# Patient Record
Sex: FEMALE | Race: WHITE | NOT HISPANIC OR LATINO | Employment: OTHER | URBAN - METROPOLITAN AREA
[De-identification: names, ages, dates, MRNs, and addresses within clinical notes are randomized per-mention and may not be internally consistent; named-entity substitution may affect disease eponyms.]

---

## 2017-08-29 ENCOUNTER — HISTORICAL (OUTPATIENT)
Dept: LAB | Facility: HOSPITAL | Age: 62
End: 2017-08-29

## 2017-08-29 LAB
ALBUMIN SERPL-MCNC: 3.8 GM/DL (ref 3.4–5)
ALBUMIN/GLOB SERPL: 1.2 RATIO (ref 1.1–2)
ALP SERPL-CCNC: 99 UNIT/L (ref 46–116)
ALT SERPL-CCNC: 16 UNIT/L (ref 12–78)
AST SERPL-CCNC: 14 UNIT/L (ref 15–37)
BILIRUB SERPL-MCNC: 0.4 MG/DL (ref 0.2–1)
BILIRUBIN DIRECT+TOT PNL SERPL-MCNC: 0.1 MG/DL (ref 0–0.2)
BILIRUBIN DIRECT+TOT PNL SERPL-MCNC: 0.3 MG/DL (ref 0–0.8)
BUN SERPL-MCNC: 15.6 MG/DL (ref 7–18)
CALCIUM SERPL-MCNC: 8.8 MG/DL (ref 8.5–10.1)
CHLORIDE SERPL-SCNC: 106 MMOL/L (ref 98–107)
CHOLEST SERPL-MCNC: 235 MG/DL (ref 0–200)
CHOLEST/HDLC SERPL: 4.4 {RATIO} (ref 0–4)
CO2 SERPL-SCNC: 29.2 MMOL/L (ref 21–32)
CREAT SERPL-MCNC: 0.96 MG/DL (ref 0.6–1.3)
GLOBULIN SER-MCNC: 3.3 GM/DL (ref 2.4–3.5)
GLUCOSE SERPL-MCNC: 87 MG/DL (ref 74–106)
HDLC SERPL-MCNC: 53 MG/DL (ref 40–60)
LDLC SERPL CALC-MCNC: 161 MG/DL (ref 0–129)
POTASSIUM SERPL-SCNC: 4.1 MMOL/L (ref 3.5–5.1)
PROT SERPL-MCNC: 7.1 GM/DL (ref 6.4–8.2)
SODIUM SERPL-SCNC: 143 MMOL/L (ref 136–145)
TRIGL SERPL-MCNC: 104 MG/DL
TSH SERPL-ACNC: 2.34 MIU/ML (ref 0.36–3.74)
VLDLC SERPL CALC-MCNC: 21 MG/DL

## 2018-03-22 ENCOUNTER — HISTORICAL (OUTPATIENT)
Dept: RADIOLOGY | Facility: HOSPITAL | Age: 63
End: 2018-03-22

## 2018-03-22 LAB
ALBUMIN SERPL-MCNC: 3.7 GM/DL (ref 3.4–5)
ALBUMIN/GLOB SERPL: 1.1 RATIO (ref 1.1–2)
ALP SERPL-CCNC: 98 UNIT/L (ref 46–116)
ALT SERPL-CCNC: 19 UNIT/L (ref 12–78)
AST SERPL-CCNC: 6 UNIT/L (ref 15–37)
BILIRUB SERPL-MCNC: 0.4 MG/DL (ref 0.2–1)
BILIRUBIN DIRECT+TOT PNL SERPL-MCNC: 0.11 MG/DL (ref 0–0.2)
BILIRUBIN DIRECT+TOT PNL SERPL-MCNC: 0.29 MG/DL (ref 0–0.8)
BUN SERPL-MCNC: 12.9 MG/DL (ref 7–18)
CALCIUM SERPL-MCNC: 9.4 MG/DL (ref 8.5–10.1)
CHLORIDE SERPL-SCNC: 105 MMOL/L (ref 98–107)
CHOLEST SERPL-MCNC: 209 MG/DL (ref 0–200)
CHOLEST/HDLC SERPL: 4.1 {RATIO} (ref 0–4)
CO2 SERPL-SCNC: 28.9 MMOL/L (ref 21–32)
CREAT SERPL-MCNC: 0.85 MG/DL (ref 0.6–1.3)
GLOBULIN SER-MCNC: 3.3 GM/DL (ref 2.4–3.5)
GLUCOSE SERPL-MCNC: 97 MG/DL (ref 74–106)
HDLC SERPL-MCNC: 51 MG/DL (ref 40–60)
LDLC SERPL CALC-MCNC: 132 MG/DL (ref 0–129)
POTASSIUM SERPL-SCNC: 4.9 MMOL/L (ref 3.5–5.1)
PROT SERPL-MCNC: 7 GM/DL (ref 6.4–8.2)
SODIUM SERPL-SCNC: 141 MMOL/L (ref 136–145)
TRIGL SERPL-MCNC: 132 MG/DL
VLDLC SERPL CALC-MCNC: 26 MG/DL

## 2019-02-14 ENCOUNTER — HISTORICAL (OUTPATIENT)
Dept: LAB | Facility: HOSPITAL | Age: 64
End: 2019-02-14

## 2019-02-14 LAB
ABS NEUT (OLG): 3.15 X10(3)/MCL (ref 2.1–9.2)
ALBUMIN SERPL-MCNC: 3.7 GM/DL (ref 3.4–5)
ALBUMIN/GLOB SERPL: 1.2 RATIO (ref 1.1–2)
ALP SERPL-CCNC: 99 UNIT/L (ref 46–116)
ALT SERPL-CCNC: 12 UNIT/L (ref 12–78)
AST SERPL-CCNC: 11 UNIT/L (ref 15–37)
BASOPHILS # BLD AUTO: 0 X10(3)/MCL (ref 0–0.2)
BASOPHILS NFR BLD AUTO: 0 %
BILIRUB SERPL-MCNC: 0.4 MG/DL (ref 0.2–1)
BILIRUBIN DIRECT+TOT PNL SERPL-MCNC: 0.11 MG/DL (ref 0–0.2)
BILIRUBIN DIRECT+TOT PNL SERPL-MCNC: 0.29 MG/DL (ref 0–0.8)
BUN SERPL-MCNC: 11.7 MG/DL (ref 7–18)
CALCIUM SERPL-MCNC: 9 MG/DL (ref 8.5–10.1)
CHLORIDE SERPL-SCNC: 107 MMOL/L (ref 98–107)
CHOLEST SERPL-MCNC: 216 MG/DL (ref 0–200)
CHOLEST/HDLC SERPL: 4.3 {RATIO} (ref 0–4)
CO2 SERPL-SCNC: 29.4 MMOL/L (ref 21–32)
CREAT SERPL-MCNC: 0.94 MG/DL (ref 0.6–1.3)
DEPRECATED CALCIDIOL+CALCIFEROL SERPL-MC: 22.23 NG/ML (ref 30–80)
EOSINOPHIL # BLD AUTO: 0.3 X10(3)/MCL (ref 0–0.9)
EOSINOPHIL NFR BLD AUTO: 5 %
ERYTHROCYTE [DISTWIDTH] IN BLOOD BY AUTOMATED COUNT: 13.1 % (ref 11.5–17)
GLOBULIN SER-MCNC: 3 GM/DL (ref 2.4–3.5)
GLUCOSE SERPL-MCNC: 95 MG/DL (ref 74–106)
HCT VFR BLD AUTO: 43.1 % (ref 37–47)
HDLC SERPL-MCNC: 50 MG/DL (ref 40–60)
HGB BLD-MCNC: 14.1 GM/DL (ref 12–16)
IMM GRANULOCYTES # BLD AUTO: 0.01 % (ref 0–0.02)
IMM GRANULOCYTES NFR BLD AUTO: 0.2 % (ref 0–0.43)
LDLC SERPL CALC-MCNC: 138 MG/DL (ref 0–129)
LYMPHOCYTES # BLD AUTO: 2.3 X10(3)/MCL (ref 0.6–4.6)
LYMPHOCYTES NFR BLD AUTO: 36 %
MCH RBC QN AUTO: 32 PG (ref 27–31)
MCHC RBC AUTO-ENTMCNC: 32.7 GM/DL (ref 33–36)
MCV RBC AUTO: 97.7 FL (ref 80–94)
MONOCYTES # BLD AUTO: 0.6 X10(3)/MCL (ref 0.1–1.3)
MONOCYTES NFR BLD AUTO: 9 %
NEUTROPHILS # BLD AUTO: 3.15 X10(3)/MCL (ref 1.4–7.9)
NEUTROPHILS NFR BLD AUTO: 50 %
PLATELET # BLD AUTO: 319 X10(3)/MCL (ref 130–400)
PMV BLD AUTO: 8.9 FL (ref 9.4–12.4)
POTASSIUM SERPL-SCNC: 5.1 MMOL/L (ref 3.5–5.1)
PROT SERPL-MCNC: 6.7 GM/DL (ref 6.4–8.2)
RBC # BLD AUTO: 4.41 X10(6)/MCL (ref 4.2–5.4)
SODIUM SERPL-SCNC: 144 MMOL/L (ref 136–145)
T4 FREE SERPL-MCNC: 0.83 NG/DL (ref 0.76–1.46)
TRIGL SERPL-MCNC: 140 MG/DL
TSH SERPL-ACNC: 1.4 MIU/ML (ref 0.36–3.74)
VLDLC SERPL CALC-MCNC: 28 MG/DL
WBC # SPEC AUTO: 6.4 X10(3)/MCL (ref 4.5–11.5)

## 2019-07-05 ENCOUNTER — HISTORICAL (OUTPATIENT)
Dept: RADIOLOGY | Facility: HOSPITAL | Age: 64
End: 2019-07-05

## 2020-02-17 ENCOUNTER — HISTORICAL (OUTPATIENT)
Dept: RADIOLOGY | Facility: HOSPITAL | Age: 65
End: 2020-02-17

## 2020-02-20 ENCOUNTER — HISTORICAL (OUTPATIENT)
Dept: LAB | Facility: HOSPITAL | Age: 65
End: 2020-02-20

## 2020-02-20 LAB
BUN SERPL-MCNC: 16.5 MG/DL (ref 7–18)
CALCIUM SERPL-MCNC: 9.8 MG/DL (ref 8.5–10.1)
CHLORIDE SERPL-SCNC: 105 MMOL/L (ref 98–107)
CO2 SERPL-SCNC: 27.6 MMOL/L (ref 21–32)
CREAT SERPL-MCNC: 0.84 MG/DL (ref 0.6–1.3)
CREAT/UREA NIT SERPL: 20
ERYTHROCYTE [DISTWIDTH] IN BLOOD BY AUTOMATED COUNT: 13.1 % (ref 11.5–17)
GLUCOSE SERPL-MCNC: 91 MG/DL (ref 74–106)
HCT VFR BLD AUTO: 43.2 % (ref 37–47)
HGB BLD-MCNC: 14.2 GM/DL (ref 12–16)
MCH RBC QN AUTO: 32.1 PG (ref 27–31)
MCHC RBC AUTO-ENTMCNC: 32.9 GM/DL (ref 33–36)
MCV RBC AUTO: 97.7 FL (ref 80–94)
PLATELET # BLD AUTO: 344 X10(3)/MCL (ref 130–400)
PMV BLD AUTO: 8.9 FL (ref 9.4–12.4)
POTASSIUM SERPL-SCNC: 5.2 MMOL/L (ref 3.5–5.1)
RBC # BLD AUTO: 4.42 X10(6)/MCL (ref 4.2–5.4)
SODIUM SERPL-SCNC: 142 MMOL/L (ref 136–145)
WBC # SPEC AUTO: 7.1 X10(3)/MCL (ref 4.5–11.5)

## 2020-02-27 ENCOUNTER — HISTORICAL (OUTPATIENT)
Dept: CARDIOLOGY | Facility: HOSPITAL | Age: 65
End: 2020-02-27

## 2020-02-27 LAB
BUN SERPL-MCNC: 13 MG/DL (ref 7–18)
CALCIUM SERPL-MCNC: 8.8 MG/DL (ref 8.5–10.1)
CHLORIDE SERPL-SCNC: 108 MMOL/L (ref 98–107)
CO2 SERPL-SCNC: 31 MMOL/L (ref 21–32)
CREAT SERPL-MCNC: 0.95 MG/DL (ref 0.55–1.02)
CREAT/UREA NIT SERPL: 13.7
GLUCOSE SERPL-MCNC: 86 MG/DL (ref 74–106)
INR PPP: 0.9 (ref 0–1.3)
POTASSIUM SERPL-SCNC: 4.1 MMOL/L (ref 3.5–5.1)
PROTHROMBIN TIME: 12.3 SECOND(S) (ref 11.1–13.7)
SODIUM SERPL-SCNC: 141 MMOL/L (ref 136–145)

## 2020-08-05 ENCOUNTER — HISTORICAL (OUTPATIENT)
Dept: LAB | Facility: HOSPITAL | Age: 65
End: 2020-08-05

## 2020-08-05 LAB
ALBUMIN SERPL-MCNC: 3.7 GM/DL (ref 3.4–5)
ALP SERPL-CCNC: 66 UNIT/L (ref 46–116)
ALT SERPL-CCNC: 17 UNIT/L (ref 12–78)
AST SERPL-CCNC: 18 UNIT/L (ref 15–37)
BILIRUB SERPL-MCNC: 0.5 MG/DL (ref 0.2–1)
BILIRUBIN DIRECT+TOT PNL SERPL-MCNC: 0.16 MG/DL (ref 0–0.2)
BILIRUBIN DIRECT+TOT PNL SERPL-MCNC: 0.34 MG/DL (ref 0–0.8)
CHOLEST SERPL-MCNC: 150 MG/DL (ref 0–200)
CHOLEST/HDLC SERPL: 2.8 {RATIO} (ref 0–4)
HDLC SERPL-MCNC: 54 MG/DL (ref 40–60)
LDLC SERPL CALC-MCNC: 79 MG/DL (ref 0–129)
PROT SERPL-MCNC: 7.4 GM/DL (ref 6.4–8.2)
TRIGL SERPL-MCNC: 84 MG/DL
VLDLC SERPL CALC-MCNC: 17 MG/DL

## 2021-03-03 ENCOUNTER — HISTORICAL (OUTPATIENT)
Dept: RADIOLOGY | Facility: HOSPITAL | Age: 66
End: 2021-03-03

## 2022-03-24 ENCOUNTER — HISTORICAL (OUTPATIENT)
Dept: ADMINISTRATIVE | Facility: HOSPITAL | Age: 67
End: 2022-03-24

## 2022-03-24 ENCOUNTER — HISTORICAL (OUTPATIENT)
Dept: RADIOLOGY | Facility: HOSPITAL | Age: 67
End: 2022-03-24

## 2023-03-22 DIAGNOSIS — Z12.31 BREAST CANCER SCREENING BY MAMMOGRAM: Primary | ICD-10-CM

## 2023-03-28 ENCOUNTER — HOSPITAL ENCOUNTER (OUTPATIENT)
Dept: RADIOLOGY | Facility: HOSPITAL | Age: 68
Discharge: HOME | End: 2023-03-28
Attending: NURSE PRACTITIONER
Payer: MEDICARE

## 2023-03-28 DIAGNOSIS — Z12.31 BREAST CANCER SCREENING BY MAMMOGRAM: ICD-10-CM

## 2023-03-28 PROCEDURE — 77063 BREAST TOMOSYNTHESIS BI: CPT | Mod: 26,,, | Performed by: RADIOLOGY

## 2023-03-28 PROCEDURE — 77067 SCR MAMMO BI INCL CAD: CPT | Mod: 26,,, | Performed by: RADIOLOGY

## 2023-03-28 PROCEDURE — 77063 MAMMO DIGITAL SCREENING BILAT WITH TOMO: ICD-10-PCS | Mod: 26,,, | Performed by: RADIOLOGY

## 2023-03-28 PROCEDURE — 77067 SCR MAMMO BI INCL CAD: CPT | Mod: TC

## 2023-03-28 PROCEDURE — 77067 MAMMO DIGITAL SCREENING BILAT WITH TOMO: ICD-10-PCS | Mod: 26,,, | Performed by: RADIOLOGY

## 2023-06-20 DIAGNOSIS — R13.10 DYSPHAGIA: Primary | ICD-10-CM

## 2023-06-29 ENCOUNTER — HOSPITAL ENCOUNTER (OUTPATIENT)
Dept: RADIOLOGY | Facility: HOSPITAL | Age: 68
Discharge: HOME | End: 2023-06-29
Attending: UROLOGY
Payer: MEDICARE

## 2023-06-29 DIAGNOSIS — R13.10 DYSPHAGIA: ICD-10-CM

## 2023-06-29 PROCEDURE — 25500020 PHARM REV CODE 255: Performed by: UROLOGY

## 2023-06-29 PROCEDURE — A9698 NON-RAD CONTRAST MATERIALNOC: HCPCS | Performed by: UROLOGY

## 2023-06-29 PROCEDURE — 74220 X-RAY XM ESOPHAGUS 1CNTRST: CPT | Mod: TC

## 2023-06-29 RX ADMIN — BARIUM SULFATE 140 ML: 980 POWDER, FOR SUSPENSION ORAL at 08:06

## 2023-06-29 RX ADMIN — Medication 176 G: at 08:06

## 2024-01-23 ENCOUNTER — HOSPITAL ENCOUNTER (INPATIENT)
Facility: HOSPITAL | Age: 69
LOS: 6 days | Discharge: HOME HEALTH | DRG: 522 | End: 2024-01-29
Attending: STUDENT IN AN ORGANIZED HEALTH CARE EDUCATION/TRAINING PROGRAM | Admitting: SURGERY
Payer: MEDICARE

## 2024-01-23 DIAGNOSIS — S72.002A CLOSED FRACTURE OF NECK OF LEFT FEMUR, INITIAL ENCOUNTER: Primary | ICD-10-CM

## 2024-01-23 DIAGNOSIS — Z01.810 PREOP CARDIOVASCULAR EXAM: ICD-10-CM

## 2024-01-23 DIAGNOSIS — S92.062A CLOSED DISPLACED INTRA-ARTICULAR FRACTURE OF LEFT CALCANEUS, INITIAL ENCOUNTER: ICD-10-CM
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
## 2024-01-23 DIAGNOSIS — S92.002A CLOSED DISPLACED FRACTURE OF LEFT CALCANEUS, UNSPECIFIED PORTION OF CALCANEUS, INITIAL ENCOUNTER: ICD-10-CM

## 2024-01-23 DIAGNOSIS — S92.012A CLOSED DISPLACED FRACTURE OF BODY OF LEFT CALCANEUS, INITIAL ENCOUNTER: ICD-10-CM

## 2024-01-23 LAB
ABORH RETYPE: NORMAL
ALBUMIN SERPL-MCNC: 3.7 G/DL (ref 3.4–4.8)
ALBUMIN/GLOB SERPL: 1.3 RATIO (ref 1.1–2)
ALP SERPL-CCNC: 96 UNIT/L (ref 40–150)
ALT SERPL-CCNC: 9 UNIT/L (ref 0–55)
AST SERPL-CCNC: 16 UNIT/L (ref 5–34)
BASOPHILS # BLD AUTO: 0.05 X10(3)/MCL
BASOPHILS NFR BLD AUTO: 0.3 %
BILIRUB SERPL-MCNC: 0.3 MG/DL
BUN SERPL-MCNC: 14.6 MG/DL (ref 9.8–20.1)
CALCIUM SERPL-MCNC: 9.5 MG/DL (ref 8.4–10.2)
CHLORIDE SERPL-SCNC: 103 MMOL/L (ref 98–107)
CO2 SERPL-SCNC: 25 MMOL/L (ref 23–31)
CREAT SERPL-MCNC: 0.9 MG/DL (ref 0.55–1.02)
EOSINOPHIL # BLD AUTO: 0.02 X10(3)/MCL (ref 0–0.9)
EOSINOPHIL NFR BLD AUTO: 0.1 %
ERYTHROCYTE [DISTWIDTH] IN BLOOD BY AUTOMATED COUNT: 12.6 % (ref 11.5–17)
GFR SERPLBLD CREATININE-BSD FMLA CKD-EPI: >60 MLS/MIN/1.73/M2
GLOBULIN SER-MCNC: 2.8 GM/DL (ref 2.4–3.5)
GLUCOSE SERPL-MCNC: 127 MG/DL (ref 82–115)
GROUP & RH: NORMAL
HCT VFR BLD AUTO: 39.5 % (ref 37–47)
HGB BLD-MCNC: 13.2 G/DL (ref 12–16)
IMM GRANULOCYTES # BLD AUTO: 0.12 X10(3)/MCL (ref 0–0.04)
IMM GRANULOCYTES NFR BLD AUTO: 0.8 %
INDIRECT COOMBS: NORMAL
LYMPHOCYTES # BLD AUTO: 0.76 X10(3)/MCL (ref 0.6–4.6)
LYMPHOCYTES NFR BLD AUTO: 4.8 %
MCH RBC QN AUTO: 32.4 PG (ref 27–31)
MCHC RBC AUTO-ENTMCNC: 33.4 G/DL (ref 33–36)
MCV RBC AUTO: 97.1 FL (ref 80–94)
MONOCYTES # BLD AUTO: 0.7 X10(3)/MCL (ref 0.1–1.3)
MONOCYTES NFR BLD AUTO: 4.5 %
NEUTROPHILS # BLD AUTO: 14.08 X10(3)/MCL (ref 2.1–9.2)
NEUTROPHILS NFR BLD AUTO: 89.5 %
NRBC BLD AUTO-RTO: 0 %
PLATELET # BLD AUTO: 285 X10(3)/MCL (ref 130–400)
PMV BLD AUTO: 9.6 FL (ref 7.4–10.4)
POTASSIUM SERPL-SCNC: 4 MMOL/L (ref 3.5–5.1)
PROT SERPL-MCNC: 6.5 GM/DL (ref 5.8–7.6)
RBC # BLD AUTO: 4.07 X10(6)/MCL (ref 4.2–5.4)
SODIUM SERPL-SCNC: 137 MMOL/L (ref 136–145)
SPECIMEN OUTDATE: NORMAL
WBC # SPEC AUTO: 15.73 X10(3)/MCL (ref 4.5–11.5)

## 2024-01-23 PROCEDURE — 25500020 PHARM REV CODE 255: Performed by: STUDENT IN AN ORGANIZED HEALTH CARE EDUCATION/TRAINING PROGRAM

## 2024-01-23 PROCEDURE — 96374 THER/PROPH/DIAG INJ IV PUSH: CPT

## 2024-01-23 PROCEDURE — 86850 RBC ANTIBODY SCREEN: CPT | Performed by: STUDENT IN AN ORGANIZED HEALTH CARE EDUCATION/TRAINING PROGRAM

## 2024-01-23 PROCEDURE — 99285 EMERGENCY DEPT VISIT HI MDM: CPT | Mod: 25

## 2024-01-23 PROCEDURE — 11000001 HC ACUTE MED/SURG PRIVATE ROOM

## 2024-01-23 PROCEDURE — 99223 1ST HOSP IP/OBS HIGH 75: CPT | Mod: 57,,, | Performed by: ORTHOPAEDIC SURGERY

## 2024-01-23 PROCEDURE — 96376 TX/PRO/DX INJ SAME DRUG ADON: CPT

## 2024-01-23 PROCEDURE — 25000003 PHARM REV CODE 250: Performed by: NURSE PRACTITIONER

## 2024-01-23 PROCEDURE — 25000003 PHARM REV CODE 250

## 2024-01-23 PROCEDURE — 25000003 PHARM REV CODE 250: Performed by: STUDENT IN AN ORGANIZED HEALTH CARE EDUCATION/TRAINING PROGRAM

## 2024-01-23 PROCEDURE — 63600175 PHARM REV CODE 636 W HCPCS: Performed by: STUDENT IN AN ORGANIZED HEALTH CARE EDUCATION/TRAINING PROGRAM

## 2024-01-23 PROCEDURE — 96375 TX/PRO/DX INJ NEW DRUG ADDON: CPT

## 2024-01-23 PROCEDURE — 63600175 PHARM REV CODE 636 W HCPCS: Performed by: NURSE PRACTITIONER

## 2024-01-23 PROCEDURE — 80053 COMPREHEN METABOLIC PANEL: CPT | Performed by: STUDENT IN AN ORGANIZED HEALTH CARE EDUCATION/TRAINING PROGRAM

## 2024-01-23 PROCEDURE — 85025 COMPLETE CBC W/AUTO DIFF WBC: CPT | Performed by: STUDENT IN AN ORGANIZED HEALTH CARE EDUCATION/TRAINING PROGRAM

## 2024-01-23 RX ORDER — ACETAMINOPHEN 325 MG/1
650 TABLET ORAL EVERY 8 HOURS PRN
Status: DISCONTINUED | OUTPATIENT
Start: 2024-01-23 | End: 2024-01-25

## 2024-01-23 RX ORDER — ACETAMINOPHEN 325 MG/1
650 TABLET ORAL EVERY 4 HOURS
Status: DISCONTINUED | OUTPATIENT
Start: 2024-01-23 | End: 2024-01-25

## 2024-01-23 RX ORDER — TALC
6 POWDER (GRAM) TOPICAL NIGHTLY PRN
Status: DISCONTINUED | OUTPATIENT
Start: 2024-01-23 | End: 2024-01-25

## 2024-01-23 RX ORDER — MORPHINE SULFATE 4 MG/ML
4 INJECTION, SOLUTION INTRAMUSCULAR; INTRAVENOUS
Status: DISCONTINUED | OUTPATIENT
Start: 2024-01-23 | End: 2024-01-23

## 2024-01-23 RX ORDER — SODIUM CHLORIDE 9 MG/ML
INJECTION, SOLUTION INTRAVENOUS CONTINUOUS
Status: DISCONTINUED | OUTPATIENT
Start: 2024-01-23 | End: 2024-01-25

## 2024-01-23 RX ORDER — ONDANSETRON HYDROCHLORIDE 2 MG/ML
4 INJECTION, SOLUTION INTRAVENOUS
Status: COMPLETED | OUTPATIENT
Start: 2024-01-23 | End: 2024-01-23

## 2024-01-23 RX ORDER — ENOXAPARIN SODIUM 100 MG/ML
40 INJECTION SUBCUTANEOUS EVERY 12 HOURS
Status: DISCONTINUED | OUTPATIENT
Start: 2024-01-23 | End: 2024-01-25

## 2024-01-23 RX ORDER — MORPHINE SULFATE 4 MG/ML
4 INJECTION, SOLUTION INTRAMUSCULAR; INTRAVENOUS
Status: COMPLETED | OUTPATIENT
Start: 2024-01-23 | End: 2024-01-23

## 2024-01-23 RX ORDER — METHOCARBAMOL 500 MG/1
500 TABLET, FILM COATED ORAL
Status: COMPLETED | OUTPATIENT
Start: 2024-01-23 | End: 2024-01-23

## 2024-01-23 RX ORDER — METHOCARBAMOL 500 MG/1
500 TABLET, FILM COATED ORAL 4 TIMES DAILY
Status: DISCONTINUED | OUTPATIENT
Start: 2024-01-23 | End: 2024-01-25

## 2024-01-23 RX ORDER — HYDROMORPHONE HYDROCHLORIDE 2 MG/ML
1 INJECTION, SOLUTION INTRAMUSCULAR; INTRAVENOUS; SUBCUTANEOUS
Status: COMPLETED | OUTPATIENT
Start: 2024-01-23 | End: 2024-01-23

## 2024-01-23 RX ORDER — OXYCODONE HYDROCHLORIDE 5 MG/1
10 TABLET ORAL EVERY 4 HOURS PRN
Status: DISCONTINUED | OUTPATIENT
Start: 2024-01-23 | End: 2024-01-25

## 2024-01-23 RX ORDER — OXYCODONE HYDROCHLORIDE 5 MG/1
5 TABLET ORAL EVERY 4 HOURS PRN
Status: DISCONTINUED | OUTPATIENT
Start: 2024-01-23 | End: 2024-01-25

## 2024-01-23 RX ORDER — ONDANSETRON 4 MG/1
8 TABLET, ORALLY DISINTEGRATING ORAL EVERY 8 HOURS PRN
Status: DISCONTINUED | OUTPATIENT
Start: 2024-01-23 | End: 2024-01-25

## 2024-01-23 RX ORDER — ONDANSETRON HYDROCHLORIDE 2 MG/ML
4 INJECTION, SOLUTION INTRAVENOUS EVERY 12 HOURS PRN
Status: DISCONTINUED | OUTPATIENT
Start: 2024-01-23 | End: 2024-01-25

## 2024-01-23 RX ADMIN — ENOXAPARIN SODIUM 40 MG: 40 INJECTION SUBCUTANEOUS at 09:01

## 2024-01-23 RX ADMIN — IOPAMIDOL 100 ML: 755 INJECTION, SOLUTION INTRAVENOUS at 03:01

## 2024-01-23 RX ADMIN — ONDANSETRON 4 MG: 2 INJECTION INTRAMUSCULAR; INTRAVENOUS at 12:01

## 2024-01-23 RX ADMIN — METHOCARBAMOL 500 MG: 500 TABLET ORAL at 09:01

## 2024-01-23 RX ADMIN — HYDROMORPHONE HYDROCHLORIDE 1 MG: 2 INJECTION INTRAMUSCULAR; INTRAVENOUS; SUBCUTANEOUS at 01:01

## 2024-01-23 RX ADMIN — METHOCARBAMOL 500 MG: 500 TABLET ORAL at 12:01

## 2024-01-23 RX ADMIN — OXYCODONE HYDROCHLORIDE 10 MG: 10 TABLET ORAL at 05:01

## 2024-01-23 RX ADMIN — OXYCODONE HYDROCHLORIDE 10 MG: 10 TABLET ORAL at 08:01

## 2024-01-23 RX ADMIN — ACETAMINOPHEN 325MG 650 MG: 325 TABLET ORAL at 05:01

## 2024-01-23 RX ADMIN — MORPHINE SULFATE 4 MG: 4 INJECTION, SOLUTION INTRAMUSCULAR; INTRAVENOUS at 12:01

## 2024-01-23 RX ADMIN — TRAZODONE HYDROCHLORIDE 25 MG: 50 TABLET ORAL at 09:01

## 2024-01-23 RX ADMIN — ONDANSETRON 4 MG: 2 INJECTION INTRAMUSCULAR; INTRAVENOUS at 01:01

## 2024-01-23 RX ADMIN — SODIUM CHLORIDE: 9 INJECTION, SOLUTION INTRAVENOUS at 05:01

## 2024-01-23 NOTE — FIRST PROVIDER EVALUATION
"Medical screening examination initiated.  I have conducted a focused provider triage encounter, findings are as follows:    Brief history of present illness:  HPI     Fall     Additional comments: Pt fell 8ft off ladder pta. Pt first hit left ankle   then left hip. Pt reports decreased sensation to left foot. Pt denies back   pain or tenderness on palpation. Swelling and bruising noted to left   foot/ankle. 2+ pedal pulse b/l. Pt denies neck pain or tenderness on   palpation.          Last edited by John Matias RN on 1/23/2024 12:00 PM.          Vitals:    01/23/24 1200   BP: 127/71   Pulse: 83   Resp: 16   Temp: 97.2 °F (36.2 °C)   SpO2: 98%   Weight: 72.6 kg (160 lb)   Height: 5' 8" (1.727 m)       Pertinent physical exam:  Alert female resting on stretcher    Brief workup plan:  X-rays, room for further examination x-ray review and suspected orthopedic contacts    Preliminary workup initiated; this workup will be continued and followed by the physician or advanced practice provider that is assigned to the patient when roomed.  "

## 2024-01-23 NOTE — H&P
Trauma Surgery   History and Physical Note    Patient Name: Che Kendall  YOB: 1955  Date: 01/23/2024 4:08 PM  Date of Admission: 1/23/2024  HD#0  POD#* No surgery found *    PRESENTING HISTORY   Chief Complaint/Reason for Admission: Fall 8ft    History of Present Illness:  This is a 67 yo female w/ history of depression who presents after falling off a ladder from 8ft while attempting to change a lightublb. She landed on her left ankle and and rolled on to her left side. She did not hit her head and she did not lose consciousness. Her  called EMS immediately after her fall. In the ED patient is doing well, complaining of left lower extremity and hip pain but is otherwise doing well. She denies illness leading up to the fall. Previous surgical history of a torn meniscus repair in 2014.     Review of Systems:  12 point ROS negative except as stated in HPI    PAST HISTORY:   Past medical history:  No past medical history on file.    Past surgical history:  No past surgical history on file.    Family history:  No family history on file.    Social history:  Social History     Socioeconomic History    Marital status:      Social History     Tobacco Use   Smoking Status Not on file   Smokeless Tobacco Not on file      Social History     Substance and Sexual Activity   Alcohol Use Not on file        MEDICATIONS & ALLERGIES:   Allergies: Review of patient's allergies indicates:  No Known Allergies  Home Meds: No current outpatient medications   No current facility-administered medications on file prior to encounter.     No current outpatient medications on file prior to encounter.      No current facility-administered medications on file prior to encounter.     No current outpatient medications on file prior to encounter.     Scheduled Meds:   acetaminophen  650 mg Oral Q4H    traZODone  25 mg Oral QHS     Continuous Infusions:   sodium chloride 0.9%       PRN Meds:acetaminophen,  "melatonin, ondansetron, ondansetron, oxyCODONE, oxyCODONE    OBJECTIVE:   Vital Signs:  VITAL SIGNS: 24 HR MIN & MAX LAST   Temp  Min: 97.2 °F (36.2 °C)  Max: 97.2 °F (36.2 °C)  97.2 °F (36.2 °C)   BP  Min: 105/67  Max: 127/71  105/67    Pulse  Min: 83  Max: 99  99    Resp  Min: 15  Max: 23  15    SpO2  Min: 98 %  Max: 98 %  98 %      HT: 5' 8" (172.7 cm)  WT: 72.6 kg (160 lb)  BMI: 24.3   Intake/output: No intake/output data recorded.     Lines/drains/airway:       Peripheral IV - Single Lumen 01/23/24 1230 18 G Right Antecubital (Active)   Site Assessment Clean;Dry;Intact;No redness;No swelling 01/23/24 1245   Line Status Flushed 01/23/24 1245   Number of days: 0       Physical Exam:  General:  Well developed, well nourished, no acute distress  HEENT:  Normocephalic, atraumatic  CV:  RR, 2+ DPs bilaterally  Resp/chest: NWOB  GI:  Abdomen soft, non-tender, non-distended  :  Deferred  MSK:  tenderness to proximal Left lower extremity, hip external rotated and shortened. Left ankle with ecchymotic changes.   Neuro: GCS 15. CNII-XII grossly intact, alert and oriented to person, place, and time. Strength and motor function grossly intact to all extremities, sensation intact to all extremities.  Skin/Wounds:  clean, dry    Labs:  Troponin:  No results for input(s): "TROPONINI" in the last 72 hours.  CBC:  Recent Labs     01/23/24  1328   WBC 15.73*   RBC 4.07*   HGB 13.2   HCT 39.5      MCV 97.1*   MCH 32.4*   MCHC 33.4     CMP:  Recent Labs     01/23/24  1328   CALCIUM 9.5   ALBUMIN 3.7      K 4.0   CO2 25   BUN 14.6   CREATININE 0.90   ALKPHOS 96   ALT 9   AST 16   BILITOT 0.3     Lactic Acid:  No results for input(s): "LACTATE" in the last 72 hours.  ETOH:  No results for input(s): "ETHANOL" in the last 72 hours.   Urine Drug Screen:  No results for input(s): "COCAINE", "OPIATE", "BARBITURATE", "AMPHETAMINE", "FENTANYL", "CANNABINOIDS", "MDMA" in the last 72 hours.    Invalid input(s): " ""BENZODIAZEPINE", "PHENCYCLIDINE"   ABG  No results for input(s): "PH", "PO2", "PCO2", "HCO3", "BE" in the last 168 hours.      Diagnostic Results:  CT Head Without Contrast   Final Result      No acute intracranial process identified.         Electronically signed by: Yan Matias   Date:    01/23/2024   Time:    15:30      CT Cervical Spine Without Contrast   Final Result      No acute cervical spine fracture or traumatic malalignment identified.         Electronically signed by: Yan Matias   Date:    01/23/2024   Time:    15:39      CT Ankle (Including Hindfoot) Without Contrast Left   Final Result      A highly comminuted intra-articular calcaneal fracture is present with relative loss of Boehler's angle and approximately 10 mm cranial displacement and impaction of the calcaneal tuberosity      The distal tibia and fibula are intact.  The syndesmosis does not appear widened.         Electronically signed by: Bentley Castillo   Date:    01/23/2024   Time:    15:41      CT Foot Without Contrast Left   Final Result      A highly comminuted intra-articular calcaneal fracture is present with fracture lines traversing all 3 facets and a relative loss of Boehler's angle. The calcaneal tuberosity fragment is cranially displaced and impacted by approximately 10 mm.         Electronically signed by: Bentley Castillo   Date:    01/23/2024   Time:    15:38      CT Chest Abdomen Pelvis With IV Contrast (XPD) NO Oral Contrast   Final Result      Transcervical left femoral neck fracture.         Electronically signed by: Estela Perez   Date:    01/23/2024   Time:    15:44      X-Ray Femur 2 View Left   Final Result      As above.         Electronically signed by: Garrison Auguste   Date:    01/23/2024   Time:    13:44      X-Ray Pelvis Routine AP   Final Result      Impacted fracture of the left femoral neck.         Electronically signed by: Garriosn Augutse   Date:    01/23/2024   Time:    13:22      X-Ray Foot Complete Left "   Final Result      Appearance of minimally displaced fracture of the distal calcaneus on lateral image with additional fracture of the 3rd proximal metatarsal as there is cortical irregularity. The metatarsal fracture is only seen on a single image.  Correlate with region of pain.         Electronically signed by: Garrison Auguste   Date:    01/23/2024   Time:    13:26      X-Ray Chest AP Portable   Final Result      No acute cardiopulmonary process.         Electronically signed by: Garrison Auguste   Date:    01/23/2024   Time:    13:16      X-Ray Ankle Complete Left   Final Result      As above         Electronically signed by: Garrison Auguste   Date:    01/23/2024   Time:    13:35          ASSESSMENT & PLAN:    69 yo female with history of depression who presents after fall off ladder.     Lower extremity pain  -MMPC    Calcaneal fracture, L femoral neck fracture  -Per ortho, will make patient NPO  -NWB on affected extremity until cleared by ortho  -PT/OT when able    Insomnia  -Restarted home trazadone    Please call surgery with further questions    Paul Lynne MD  [employer] Surgery PGY1

## 2024-01-23 NOTE — ED PROVIDER NOTES
Encounter Date: 1/23/2024    SCRIBE #1 NOTE: I, Augustus Huston, am scribing for, and in the presence of,  Ruddy Vázquez IV, MD. I have scribed the following portions of the note - Other sections scribed: HPI,ROS,PE.       History     Chief Complaint   Patient presents with    Fall     Pt fell 8ft off ladder pta. Pt first hit left ankle then left hip. Pt reports decreased sensation to left foot. Pt denies back pain or tenderness on palpation. Swelling and bruising noted to left foot/ankle. 2+ pedal pulse b/l. Pt denies neck pain or tenderness on palpation.     69 y/o female with PMHx of HLD presents to ED via EMS c/o fall onset today. relative in room reports pt fell off a ladder ~8 feet trying to change a lightbulb. relative reports pt fell on her left ankle and left hip. relative denies LOC, head trauma, or blood thinners. Pt c/o left hip and left ankle pain. She denies any back pain.     The history is provided by the patient and the EMS personnel. No  was used.     Review of patient's allergies indicates:  No Known Allergies  No past medical history on file.  No past surgical history on file.  No family history on file.     Review of Systems   Constitutional:  Negative for chills and fever.   HENT:  Negative for congestion, rhinorrhea and sore throat.    Eyes:  Negative for visual disturbance.   Respiratory:  Negative for cough and shortness of breath.    Cardiovascular:  Negative for chest pain and leg swelling.   Gastrointestinal:  Negative for abdominal pain, nausea and vomiting.   Genitourinary:  Negative for dysuria, hematuria, vaginal bleeding and vaginal discharge.   Musculoskeletal:  Positive for arthralgias (left hip and left ankle pain). Negative for back pain and joint swelling.   Skin:  Negative for rash.   Neurological:  Negative for weakness.   Psychiatric/Behavioral:  Negative for confusion.        Physical Exam     Initial Vitals [01/23/24 1200]   BP Pulse Resp Temp SpO2    127/71 83 16 97.2 °F (36.2 °C) 98 %      MAP       --         Physical Exam    Nursing note and vitals reviewed.  Constitutional: She is not diaphoretic. No distress.   Cardiovascular:  Normal rate and regular rhythm.           No murmur heard.  Pulses:       Dorsalis pedis pulses are 2+ on the left side.        Posterior tibial pulses are 2+ on the left side.   Pulmonary/Chest: Breath sounds normal. No respiratory distress. She has no wheezes. She has no rales.   Abdominal: Abdomen is soft. She exhibits no distension. There is no abdominal tenderness.   Musculoskeletal:         General: Tenderness (left hip) present.      Comments: Left hip externally rotated and shortened.   Left femur tenderness.   Left ankle tenderness and mild swelling with no deformity noted.      Neurological: She is alert.   Intact sensation to LLE.   Psychiatric: She has a normal mood and affect.         ED Course   Procedures  Labs Reviewed   COMPREHENSIVE METABOLIC PANEL - Abnormal; Notable for the following components:       Result Value    Glucose Level 127 (*)     All other components within normal limits   CBC WITH DIFFERENTIAL - Abnormal; Notable for the following components:    WBC 15.73 (*)     RBC 4.07 (*)     MCV 97.1 (*)     MCH 32.4 (*)     Neut # 14.08 (*)     IG# 0.12 (*)     All other components within normal limits   CBC W/ AUTO DIFFERENTIAL    Narrative:     The following orders were created for panel order CBC auto differential.  Procedure                               Abnormality         Status                     ---------                               -----------         ------                     CBC with Differential[[id number]]       Abnormal            Final result                 Please view results for these tests on the individual orders.   TYPE & SCREEN   ABORH RETYPE          Imaging Results              CT Head Without Contrast (Final result)  Result time 01/23/24 15:30:22      Final result by Florencio  Yan VÁZQUEZ MD (01/23/24 15:30:22)                   Impression:      No acute intracranial process identified.      Electronically signed by: Yan Matias  Date:    01/23/2024  Time:    15:30               Narrative:    EXAMINATION:  CT HEAD WITHOUT CONTRAST    CLINICAL HISTORY:  Trauma;    TECHNIQUE:  CT images of the head without IV contrast. Axial, coronal and sagittal images reviewed. Dose length product 1295 mGycm. Automatic exposure control, adjustment of mA/kV or iterative reconstruction technique used to limit radiation dose.    COMPARISON:  CT 03/05/2014    FINDINGS:  Extra-axial spaces/ventricular system: Normal for age.    Intracranial hemorrhage: None identified.    Cerebral parenchyma: No acute large vessel territory infarct or mass effect identified.    Vascular system: No hyperdense vessel appreciated.    Cerebellum: Normal.    Sella: Normal.    Included paranasal sinuses and mastoid air cells: Well-aerated.    Visualized orbits: Normal.    Scalp/Calvarium: No depressed skull fracture.                                       CT Cervical Spine Without Contrast (Final result)  Result time 01/23/24 15:39:33      Final result by Yan Matias MD (01/23/24 15:39:33)                   Impression:      No acute cervical spine fracture or traumatic malalignment identified.      Electronically signed by: Yan Matias  Date:    01/23/2024  Time:    15:39               Narrative:    EXAMINATION:  CT CERVICAL SPINE WITHOUT CONTRAST    CLINICAL HISTORY:  Trauma;    TECHNIQUE:  Noncontrast CT images of the cervical spine. Axial, coronal, and sagittal reformatted images reviewed. Dose length product is 1295 mGycm. Automatic exposure control, adjustment of mA/kV or iterative reconstruction technique used to limit radiation dose.    COMPARISON:  No relevant comparison studies available at the time of dictation.    FINDINGS:  Fractures: No acute fracture identified.    Alignment: Mild reversal of the cervical  lordosis superiorly.  Minimal anterolisthesis retrolisthesis of C5 on C6, likely degenerative.    Prevertebral soft tissues: Within normal limits.    Degenerative changes: Osteophytosis and moderate to severe disc space narrowing in the mid to lower cervical spine.  Disc space narrowing greatest at C5-6.  Mild facet arthropathy on the left at C4-5.    Incidental findings: None identified.                                       CT Ankle (Including Hindfoot) Without Contrast Left (Final result)  Result time 01/23/24 15:41:25      Final result by Bentely Castillo MD (01/23/24 15:41:25)                   Impression:      A highly comminuted intra-articular calcaneal fracture is present with relative loss of Boehler's angle and approximately 10 mm cranial displacement and impaction of the calcaneal tuberosity    The distal tibia and fibula are intact.  The syndesmosis does not appear widened.      Electronically signed by: Bentley Castillo  Date:    01/23/2024  Time:    15:41               Narrative:    EXAMINATION:  CT ANKLE (INCLUDING HINDFOOT) WITHOUT CONTRAST LEFT    CLINICAL HISTORY:  Fracture, ankle;    TECHNIQUE:  Unenhanced axial images of the left ankle were obtained along with coronal and sagittal reconstructions.  Automatic exposure control was used to reduce radiation exposure to the patient.    COMPARISON:  CT scan of the foot used for comparison dated 01/23/2024.  Radiographs of the ankle used for comparison dated 01/23/2024    FINDINGS:  A highly comminuted intra-articular calcaneal fracture is present with relative loss of Boehler's angle and approximately 10 mm cranial displacement and impaction of the calcaneal tuberosity.  The talar dome is intact.  The syndesmosis is preserved.  The medial and lateral malleoli are intact.  The ankle joint space is relatively symmetric.    The study is not optimized to evaluate the ligaments and soft tissues but the anterior talofibular ligament is partially visualized on  image 49 of series 2 and is likely intact.  The deltoid ligament complex is likely intact.  The tendons do not appear discretely torn.  Subcutaneous edema is present about the ankle and hindfoot.  Small ankle and subtalar joint effusions are present.                                       CT Foot Without Contrast Left (Final result)  Result time 01/23/24 15:38:08      Final result by Bentley Castillo MD (01/23/24 15:38:08)                   Impression:      A highly comminuted intra-articular calcaneal fracture is present with fracture lines traversing all 3 facets and a relative loss of Boehler's angle. The calcaneal tuberosity fragment is cranially displaced and impacted by approximately 10 mm.      Electronically signed by: Bentley Castillo  Date:    01/23/2024  Time:    15:38               Narrative:    EXAMINATION:  CT FOOT WITHOUT CONTRAST LEFT    CLINICAL HISTORY:  Fracture, foot;    TECHNIQUE:  Unenhanced axial images of the left foot were obtained along with coronal and sagittal reconstructions.  Automatic exposure control was used to reduce radiation exposure to the patient.    COMPARISON:  Radiographs left foot used for comparison dated 01/23/2024    FINDINGS:  A highly comminuted intra-articular calcaneal fracture is present with fracture lines traversing all 3 facets and a relative loss of Boehler's angle.  The calcaneal tuberosity fragment is cranially displaced and impacted by approximately 10 mm.    The talus is intact.  The base of the 2nd metatarsal is well aligned with the middle cuneiform.  Tarsometatarsal joints are preserved.  Metatarsophalangeal joints are preserved.  The syndesmosis does not appear widened.  The medial and lateral malleoli are intact.    The study is not optimized to evaluate the soft tissues however the the tendons about the ankle are relatively well visualized and do not appear discretely torn.  The plantar fascia is intact.  Small ankle and subtalar joint effusions are  present.                                       CT Chest Abdomen Pelvis With IV Contrast (XPD) NO Oral Contrast (Final result)  Result time 01/23/24 15:44:22      Final result by Estela Perez MD (01/23/24 15:44:22)                   Impression:      Transcervical left femoral neck fracture.      Electronically signed by: Estela Perez  Date:    01/23/2024  Time:    15:44               Narrative:    EXAMINATION:  CT CHEST ABDOMEN PELVIS WITH IV CONTRAST (XPD)    CLINICAL HISTORY:  Trauma;    TECHNIQUE:  Helical acquisition with axial, sagittal and coronal reformations obtained from the thoracic inlet to the pubic symphysis following the IV administration of contrast.    Automated tube current modulation, weight-based exposure dosing, and/or iterative reconstruction technique utilized to reach lowest reasonably achievable exposure rate.    DLP: 403 mGy*cm    COMPARISON:  No relevant priors available for comparison at time of this dictation    FINDINGS:  BASE OF NECK: No significant abnormality.    HEART: Normal size. No effusion. There are coronary artery calcifications.    THORACIC VASCULATURE: Mild aortic atherosclerosis.  Ascending aorta measures 3 cm.  Pulmonary artery measures 3.3 cm..    RUTH/MEDIASTINUM: No enlarged lymph nodes by size criteria.    AIRWAYS: Patent.    LUNGS/PLEURA: No pleural effusion or pneumothorax.  Mild biapical scarring.    THORACIC SOFT TISSUES: Bilateral breast implants.    LIVER: Normal attenuation. No appreciable focal hepatic lesion.    BILIARY: No calcified gallstones.    PANCREAS: No inflammatory change.    SPLEEN: Normal in size    ADRENALS: No mass.    KIDNEYS/URETERS: The kidneys enhance symmetrically.  No hydronephrosis.    GI TRACT/MESENTERY:  No evidence of bowel obstruction or inflammation.    PERITONEUM: No free fluid.No free air.    LYMPH NODES: No enlarged lymph nodes by size criteria.    ABDOMINOPELVIC VASCULATURE: Aortoiliac atherosclerosis.  There is reflux  into the left gonadal vein.    BLADDER: Normal appearance given degree of distention.    REPRODUCTIVE ORGANS: Normal as visualized.    ABDOMINAL WALL: Unremarkable.    BONES: Trace anterolisthesis of T2 on T3.  Trace retrolisthesis of L2 on L3.  Degenerative change at the lumbar spine.  Impacted left femoral neck transcervical fracture.                                       X-Ray Femur 2 View Left (Final result)  Result time 01/23/24 13:44:33      Final result by Garrison Auguste MD (01/23/24 13:44:33)                   Impression:      As above.      Electronically signed by: Garrison Auguste  Date:    01/23/2024  Time:    13:44               Narrative:    EXAMINATION:  XR FEMUR 2 VIEW LEFT    CLINICAL HISTORY:  fall;    TECHNIQUE:  Two views of the left femur.    COMPARISON:  No prior imaging available for comparison    FINDINGS:  Impacted fracture of the left femoral neck.  No additional fracture appreciated.  Distal femur appears intact.                                       X-Ray Pelvis Routine AP (Final result)  Result time 01/23/24 13:22:59   Procedure changed from X-Ray Hip 2 or 3 views Left (with Pelvis when performed)     Final result by Garrison Auguste MD (01/23/24 13:22:59)                   Impression:      Impacted fracture of the left femoral neck.      Electronically signed by: Garrison Auguste  Date:    01/23/2024  Time:    13:22               Narrative:    EXAMINATION:  XR PELVIS ROUTINE AP    CLINICAL HISTORY:  fall;    TECHNIQUE:  Single view of the pelvis.    COMPARISON:  No prior imaging available for comparison    FINDINGS:  Sacroiliac joints are symmetric.  Pubic symphysis is congruent.  Impacted fracture of the left femoral neck.                                       X-Ray Foot Complete Left (Final result)  Result time 01/23/24 13:26:18      Final result by Garrison Auguste MD (01/23/24 13:26:18)                   Impression:      Appearance of minimally displaced fracture of the distal  calcaneus on lateral image with additional fracture of the 3rd proximal metatarsal as there is cortical irregularity. The metatarsal fracture is only seen on a single image.  Correlate with region of pain.      Electronically signed by: Garrison Auguste  Date:    01/23/2024  Time:    13:26               Narrative:    EXAMINATION:  XR FOOT COMPLETE 3 VIEW LEFT    CLINICAL HISTORY:  Unspecified fall, initial encounter    TECHNIQUE:  Radiographs of the left foot with AP, lateral and oblique  views.    COMPARISON:  No prior imaging available for comparison    FINDINGS:  Appearance of minimally displaced fracture of the distal calcaneus on lateral image with additional fracture of the 3rd proximal metatarsal as there is cortical irregularity.  The metatarsal fracture is only seen on a single image.  Correlate with region of pain.                                       X-Ray Chest AP Portable (Final result)  Result time 01/23/24 13:16:28      Final result by Garrison Auguste MD (01/23/24 13:16:28)                   Impression:      No acute cardiopulmonary process.      Electronically signed by: Garrison Auguste  Date:    01/23/2024  Time:    13:16               Narrative:    EXAMINATION:  XR CHEST AP PORTABLE    CLINICAL HISTORY:  fall;    TECHNIQUE:  Single view of the chest    COMPARISON:  07/04/2012    FINDINGS:  No focal opacification, pleural effusion, or pneumothorax.    The cardiomediastinal silhouette is within normal limits.    No acute osseous abnormality.                                       X-Ray Ankle Complete Left (Final result)  Result time 01/23/24 13:35:13      Final result by Garrison Auguste MD (01/23/24 13:35:13)                   Impression:      As above      Electronically signed by: Garrison Auguste  Date:    01/23/2024  Time:    13:35               Narrative:    EXAMINATION:  XR ANKLE COMPLETE 3 VIEW LEFT    CLINICAL HISTORY:  Unspecified fall, initial encounter    TECHNIQUE:  Radiographs of the  left ankle with AP, lateral and oblique  views.    COMPARISON:  01/23/2024    FINDINGS:  Irregularity at the distal aspect of the spleen ankle possibly represents chronic fracture.  This is unknown.  Recommend further evaluation with CT.  There is significant soft tissue edema.  The bilateral malleoli appear intact.  No definite metatarsal fracture noted.                                       Medications   0.9%  NaCl infusion (has no administration in time range)   ondansetron disintegrating tablet 8 mg (has no administration in time range)   ondansetron injection 4 mg (has no administration in time range)   melatonin tablet 6 mg (has no administration in time range)   acetaminophen tablet 650 mg (has no administration in time range)   acetaminophen tablet 650 mg (has no administration in time range)   oxyCODONE immediate release tablet 5 mg (has no administration in time range)   oxyCODONE immediate release tablet Tab 10 mg (has no administration in time range)   trazodone split tablet 25 mg (has no administration in time range)   enoxaparin injection 40 mg (has no administration in time range)   methocarbamoL tablet 500 mg (500 mg Oral Given 1/23/24 1247)   morphine injection 4 mg (4 mg Intravenous Given 1/23/24 1248)   ondansetron injection 4 mg (4 mg Intravenous Given 1/23/24 1248)   ondansetron injection 4 mg (4 mg Intravenous Given 1/23/24 1352)   HYDROmorphone (PF) injection 1 mg (1 mg Intravenous Given 1/23/24 1352)   iopamidoL (ISOVUE-370) injection 100 mL (100 mLs Intravenous Given 1/23/24 1517)     Medical Decision Making  69 yo presenting with fall 8 feet off ladder, onto LLE  Exam as above  XR/Cts with L fem neck fx, L calcaneus fx. No other injuries   Discussed with orthopedic surgery Dr. Chen, trauma surgery [place]  Trauma surgery will admit    The differential diagnosis includes, but is not limited to:  abrasion, contusion, fracture, traumatic ICH, TBI, concussion, spinal injury, fracture,  pneumothorax, hemothorax, intrathoracic injury, intraabdominal injury, hemorrhage, laceration       Problems Addressed:  Closed displaced fracture of left calcaneus, unspecified portion of calcaneus, initial encounter: acute illness or injury that poses a threat to life or bodily functions  Closed fracture of neck of left femur, initial encounter: acute illness or injury that poses a threat to life or bodily functions  Fall: acute illness or injury that poses a threat to life or bodily functions    Amount and/or Complexity of Data Reviewed  Independent Historian: friend     Details: relative in room reports pt fell off a ladder ~8 feet trying to change a lightbulb. relative reports pt fell on her left ankle and left hip. relative denies LOC, head trauma, or blood thinners.  Labs: ordered.  Radiology: ordered and independent interpretation performed.  Discussion of management or test interpretation with external provider(s): Discussed with ortho Dr. Chen - admit to trauma  Trauma surgery - will admit     Risk  Prescription drug management.  Parenteral controlled substances.  Decision regarding hospitalization.            Scribe Attestation:   Scribe #1: I performed the above scribed service and the documentation accurately describes the services I performed. I attest to the accuracy of the note.    Attending Attestation:           Physician Attestation for Scribe:  Physician Attestation Statement for Scribe #1: I, Ruddy Vázquez IV, MD, reviewed documentation, as scribed by Augustus Huston in my presence, and it is both accurate and complete.             ED Course as of 01/23/24 1715   Tue Jan 23, 2024   1413 Paged ortho  [ULYSSES]   1515 Paged trauma [ULYSSES]      ED Course User Index  [ULYSSES] Augustus Huston                           Clinical Impression:  Final diagnoses:  [W19.XXXA] Fall  [S72.002A] Closed fracture of neck of left femur, initial encounter (Primary)  [S92.002A] Closed displaced fracture of left calcaneus,  unspecified portion of calcaneus, initial encounter          ED Disposition Condition    Admit                 Ruddy Vázquez IV, MD  01/23/24 1437

## 2024-01-24 ENCOUNTER — ANESTHESIA (OUTPATIENT)
Dept: SURGERY | Facility: HOSPITAL | Age: 69
DRG: 522 | End: 2024-01-24
Payer: MEDICARE

## 2024-01-24 ENCOUNTER — ANESTHESIA EVENT (OUTPATIENT)
Dept: SURGERY | Facility: HOSPITAL | Age: 69
DRG: 522 | End: 2024-01-24
Payer: MEDICARE

## 2024-01-24 LAB
ALBUMIN SERPL-MCNC: 3.5 G/DL (ref 3.4–4.8)
ALBUMIN/GLOB SERPL: 1.2 RATIO (ref 1.1–2)
ALP SERPL-CCNC: 85 UNIT/L (ref 40–150)
ALT SERPL-CCNC: 9 UNIT/L (ref 0–55)
AST SERPL-CCNC: 22 UNIT/L (ref 5–34)
BASOPHILS # BLD AUTO: 0.03 X10(3)/MCL
BASOPHILS NFR BLD AUTO: 0.3 %
BILIRUB SERPL-MCNC: 0.5 MG/DL
BUN SERPL-MCNC: 12.2 MG/DL (ref 9.8–20.1)
CALCIUM SERPL-MCNC: 8.8 MG/DL (ref 8.4–10.2)
CHLORIDE SERPL-SCNC: 108 MMOL/L (ref 98–107)
CO2 SERPL-SCNC: 22 MMOL/L (ref 23–31)
CREAT SERPL-MCNC: 0.85 MG/DL (ref 0.55–1.02)
EOSINOPHIL # BLD AUTO: 0.01 X10(3)/MCL (ref 0–0.9)
EOSINOPHIL NFR BLD AUTO: 0.1 %
ERYTHROCYTE [DISTWIDTH] IN BLOOD BY AUTOMATED COUNT: 12.7 % (ref 11.5–17)
GFR SERPLBLD CREATININE-BSD FMLA CKD-EPI: >60 MLS/MIN/1.73/M2
GLOBULIN SER-MCNC: 2.9 GM/DL (ref 2.4–3.5)
GLUCOSE SERPL-MCNC: 109 MG/DL (ref 82–115)
HCT VFR BLD AUTO: 37.6 % (ref 37–47)
HGB BLD-MCNC: 12.7 G/DL (ref 12–16)
IMM GRANULOCYTES # BLD AUTO: 0.04 X10(3)/MCL (ref 0–0.04)
IMM GRANULOCYTES NFR BLD AUTO: 0.4 %
LYMPHOCYTES # BLD AUTO: 0.97 X10(3)/MCL (ref 0.6–4.6)
LYMPHOCYTES NFR BLD AUTO: 9.9 %
MCH RBC QN AUTO: 32.6 PG (ref 27–31)
MCHC RBC AUTO-ENTMCNC: 33.8 G/DL (ref 33–36)
MCV RBC AUTO: 96.4 FL (ref 80–94)
MONOCYTES # BLD AUTO: 0.96 X10(3)/MCL (ref 0.1–1.3)
MONOCYTES NFR BLD AUTO: 9.8 %
NEUTROPHILS # BLD AUTO: 7.82 X10(3)/MCL (ref 2.1–9.2)
NEUTROPHILS NFR BLD AUTO: 79.5 %
NRBC BLD AUTO-RTO: 0 %
PLATELET # BLD AUTO: 265 X10(3)/MCL (ref 130–400)
PMV BLD AUTO: 9.3 FL (ref 7.4–10.4)
POTASSIUM SERPL-SCNC: 4.1 MMOL/L (ref 3.5–5.1)
PROT SERPL-MCNC: 6.4 GM/DL (ref 5.8–7.6)
RBC # BLD AUTO: 3.9 X10(6)/MCL (ref 4.2–5.4)
SODIUM SERPL-SCNC: 138 MMOL/L (ref 136–145)
WBC # SPEC AUTO: 9.83 X10(3)/MCL (ref 4.5–11.5)

## 2024-01-24 PROCEDURE — 80053 COMPREHEN METABOLIC PANEL: CPT

## 2024-01-24 PROCEDURE — 28415 OPTX CALCANEAL FRACTURE: CPT | Mod: LT,,, | Performed by: ORTHOPAEDIC SURGERY

## 2024-01-24 PROCEDURE — 25000003 PHARM REV CODE 250: Performed by: ORTHOPAEDIC SURGERY

## 2024-01-24 PROCEDURE — 85025 COMPLETE CBC W/AUTO DIFF WBC: CPT

## 2024-01-24 PROCEDURE — 25000003 PHARM REV CODE 250: Performed by: NURSE ANESTHETIST, CERTIFIED REGISTERED

## 2024-01-24 PROCEDURE — 63600175 PHARM REV CODE 636 W HCPCS: Performed by: ANESTHESIOLOGY

## 2024-01-24 PROCEDURE — 25000003 PHARM REV CODE 250: Performed by: NURSE PRACTITIONER

## 2024-01-24 PROCEDURE — 93005 ELECTROCARDIOGRAM TRACING: CPT

## 2024-01-24 PROCEDURE — C1713 ANCHOR/SCREW BN/BN,TIS/BN: HCPCS | Performed by: ORTHOPAEDIC SURGERY

## 2024-01-24 PROCEDURE — 63600175 PHARM REV CODE 636 W HCPCS: Performed by: NURSE ANESTHETIST, CERTIFIED REGISTERED

## 2024-01-24 PROCEDURE — 37000008 HC ANESTHESIA 1ST 15 MINUTES: Performed by: ORTHOPAEDIC SURGERY

## 2024-01-24 PROCEDURE — 11000001 HC ACUTE MED/SURG PRIVATE ROOM

## 2024-01-24 PROCEDURE — 63600175 PHARM REV CODE 636 W HCPCS: Performed by: ORTHOPAEDIC SURGERY

## 2024-01-24 PROCEDURE — C1769 GUIDE WIRE: HCPCS | Performed by: ORTHOPAEDIC SURGERY

## 2024-01-24 PROCEDURE — D9220A PRA ANESTHESIA: Mod: CRNA,,, | Performed by: NURSE ANESTHETIST, CERTIFIED REGISTERED

## 2024-01-24 PROCEDURE — 25000003 PHARM REV CODE 250

## 2024-01-24 PROCEDURE — 36000708 HC OR TIME LEV III 1ST 15 MIN: Performed by: ORTHOPAEDIC SURGERY

## 2024-01-24 PROCEDURE — D9220A PRA ANESTHESIA: Mod: ANES,,, | Performed by: ANESTHESIOLOGY

## 2024-01-24 PROCEDURE — 37000009 HC ANESTHESIA EA ADD 15 MINS: Performed by: ORTHOPAEDIC SURGERY

## 2024-01-24 PROCEDURE — 27201423 OPTIME MED/SURG SUP & DEVICES STERILE SUPPLY: Performed by: ORTHOPAEDIC SURGERY

## 2024-01-24 PROCEDURE — 51702 INSERT TEMP BLADDER CATH: CPT

## 2024-01-24 PROCEDURE — 0QSM04Z REPOSITION LEFT TARSAL WITH INTERNAL FIXATION DEVICE, OPEN APPROACH: ICD-10-PCS | Performed by: ORTHOPAEDIC SURGERY

## 2024-01-24 PROCEDURE — 71000033 HC RECOVERY, INTIAL HOUR: Performed by: ORTHOPAEDIC SURGERY

## 2024-01-24 PROCEDURE — 36000709 HC OR TIME LEV III EA ADD 15 MIN: Performed by: ORTHOPAEDIC SURGERY

## 2024-01-24 PROCEDURE — 93010 ELECTROCARDIOGRAM REPORT: CPT | Mod: ,,, | Performed by: INTERNAL MEDICINE

## 2024-01-24 PROCEDURE — 63600175 PHARM REV CODE 636 W HCPCS: Performed by: NURSE PRACTITIONER

## 2024-01-24 PROCEDURE — P9047 ALBUMIN (HUMAN), 25%, 50ML: HCPCS | Mod: JZ,JG | Performed by: NURSE ANESTHETIST, CERTIFIED REGISTERED

## 2024-01-24 PROCEDURE — 71000039 HC RECOVERY, EACH ADD'L HOUR: Performed by: ORTHOPAEDIC SURGERY

## 2024-01-24 DEVICE — IMPLANTABLE DEVICE: Type: IMPLANTABLE DEVICE | Site: LEG | Status: FUNCTIONAL

## 2024-01-24 RX ORDER — ONDANSETRON HYDROCHLORIDE 2 MG/ML
INJECTION, SOLUTION INTRAVENOUS
Status: DISCONTINUED | OUTPATIENT
Start: 2024-01-24 | End: 2024-01-24

## 2024-01-24 RX ORDER — CITALOPRAM 40 MG/1
40 TABLET, FILM COATED ORAL DAILY
COMMUNITY
Start: 2023-11-29

## 2024-01-24 RX ORDER — LIDOCAINE HYDROCHLORIDE 20 MG/ML
INJECTION, SOLUTION EPIDURAL; INFILTRATION; INTRACAUDAL; PERINEURAL
Status: DISCONTINUED | OUTPATIENT
Start: 2024-01-24 | End: 2024-01-24

## 2024-01-24 RX ORDER — LIDOCAINE HYDROCHLORIDE 10 MG/ML
1 INJECTION, SOLUTION EPIDURAL; INFILTRATION; INTRACAUDAL; PERINEURAL ONCE
Status: CANCELLED | OUTPATIENT
Start: 2024-01-24 | End: 2024-01-24

## 2024-01-24 RX ORDER — PROPOFOL 10 MG/ML
VIAL (ML) INTRAVENOUS
Status: DISCONTINUED | OUTPATIENT
Start: 2024-01-24 | End: 2024-01-24

## 2024-01-24 RX ORDER — ROCURONIUM BROMIDE 10 MG/ML
INJECTION, SOLUTION INTRAVENOUS
Status: DISCONTINUED | OUTPATIENT
Start: 2024-01-24 | End: 2024-01-24

## 2024-01-24 RX ORDER — FENTANYL CITRATE 50 UG/ML
INJECTION, SOLUTION INTRAMUSCULAR; INTRAVENOUS
Status: DISCONTINUED | OUTPATIENT
Start: 2024-01-24 | End: 2024-01-24

## 2024-01-24 RX ORDER — CEFAZOLIN SODIUM 2 G/50ML
2 SOLUTION INTRAVENOUS
Status: DISCONTINUED | OUTPATIENT
Start: 2024-01-24 | End: 2024-01-25

## 2024-01-24 RX ORDER — SODIUM CHLORIDE, SODIUM GLUCONATE, SODIUM ACETATE, POTASSIUM CHLORIDE AND MAGNESIUM CHLORIDE 30; 37; 368; 526; 502 MG/100ML; MG/100ML; MG/100ML; MG/100ML; MG/100ML
INJECTION, SOLUTION INTRAVENOUS CONTINUOUS
Status: CANCELLED | OUTPATIENT
Start: 2024-01-24 | End: 2024-02-23

## 2024-01-24 RX ORDER — ROSUVASTATIN CALCIUM 20 MG/1
20 TABLET, COATED ORAL NIGHTLY
COMMUNITY

## 2024-01-24 RX ORDER — DIPHENHYDRAMINE HYDROCHLORIDE 50 MG/ML
25 INJECTION INTRAMUSCULAR; INTRAVENOUS EVERY 6 HOURS PRN
Status: DISCONTINUED | OUTPATIENT
Start: 2024-01-24 | End: 2024-01-24

## 2024-01-24 RX ORDER — ONDANSETRON HYDROCHLORIDE 2 MG/ML
4 INJECTION, SOLUTION INTRAVENOUS DAILY PRN
Status: DISCONTINUED | OUTPATIENT
Start: 2024-01-24 | End: 2024-01-24

## 2024-01-24 RX ORDER — EPHEDRINE SULFATE 50 MG/ML
INJECTION, SOLUTION INTRAVENOUS
Status: DISCONTINUED | OUTPATIENT
Start: 2024-01-24 | End: 2024-01-24

## 2024-01-24 RX ORDER — METOCLOPRAMIDE HYDROCHLORIDE 5 MG/ML
10 INJECTION INTRAMUSCULAR; INTRAVENOUS EVERY 10 MIN PRN
Status: DISCONTINUED | OUTPATIENT
Start: 2024-01-24 | End: 2024-01-24

## 2024-01-24 RX ORDER — LORAZEPAM 2 MG/ML
0.25 INJECTION INTRAMUSCULAR ONCE AS NEEDED
Status: DISCONTINUED | OUTPATIENT
Start: 2024-01-24 | End: 2024-01-24

## 2024-01-24 RX ORDER — HYDROMORPHONE HYDROCHLORIDE 2 MG/ML
0.4 INJECTION, SOLUTION INTRAMUSCULAR; INTRAVENOUS; SUBCUTANEOUS EVERY 5 MIN PRN
Status: DISCONTINUED | OUTPATIENT
Start: 2024-01-24 | End: 2024-01-24

## 2024-01-24 RX ORDER — CITALOPRAM 20 MG/1
40 TABLET, FILM COATED ORAL DAILY
Status: DISCONTINUED | OUTPATIENT
Start: 2024-01-24 | End: 2024-01-24

## 2024-01-24 RX ORDER — DEXAMETHASONE SODIUM PHOSPHATE 4 MG/ML
INJECTION, SOLUTION INTRA-ARTICULAR; INTRALESIONAL; INTRAMUSCULAR; INTRAVENOUS; SOFT TISSUE
Status: DISCONTINUED | OUTPATIENT
Start: 2024-01-24 | End: 2024-01-24

## 2024-01-24 RX ORDER — TRAZODONE HYDROCHLORIDE 50 MG/1
100 TABLET ORAL NIGHTLY
Status: DISCONTINUED | OUTPATIENT
Start: 2024-01-24 | End: 2024-01-29 | Stop reason: HOSPADM

## 2024-01-24 RX ORDER — PHENYLEPHRINE HYDROCHLORIDE 10 MG/ML
INJECTION INTRAVENOUS
Status: DISCONTINUED | OUTPATIENT
Start: 2024-01-24 | End: 2024-01-24

## 2024-01-24 RX ORDER — MIDAZOLAM HYDROCHLORIDE 1 MG/ML
INJECTION INTRAMUSCULAR; INTRAVENOUS
Status: DISCONTINUED | OUTPATIENT
Start: 2024-01-24 | End: 2024-01-24

## 2024-01-24 RX ORDER — ALBUMIN HUMAN 250 G/1000ML
SOLUTION INTRAVENOUS
Status: DISCONTINUED | OUTPATIENT
Start: 2024-01-24 | End: 2024-01-24

## 2024-01-24 RX ORDER — ONDANSETRON 4 MG/1
4 TABLET, ORALLY DISINTEGRATING ORAL ONCE
Status: CANCELLED | OUTPATIENT
Start: 2024-01-24 | End: 2024-01-24

## 2024-01-24 RX ORDER — KETOROLAC TROMETHAMINE 30 MG/ML
15 INJECTION, SOLUTION INTRAMUSCULAR; INTRAVENOUS EVERY 6 HOURS
Status: DISCONTINUED | OUTPATIENT
Start: 2024-01-24 | End: 2024-01-25

## 2024-01-24 RX ORDER — MIDAZOLAM HYDROCHLORIDE 1 MG/ML
2 INJECTION INTRAMUSCULAR; INTRAVENOUS ONCE AS NEEDED
Status: CANCELLED | OUTPATIENT
Start: 2024-01-24 | End: 2035-06-22

## 2024-01-24 RX ORDER — CITALOPRAM 20 MG/1
40 TABLET, FILM COATED ORAL NIGHTLY
Status: DISCONTINUED | OUTPATIENT
Start: 2024-01-25 | End: 2024-01-29 | Stop reason: HOSPADM

## 2024-01-24 RX ORDER — TRAZODONE HYDROCHLORIDE 100 MG/1
100 TABLET ORAL NIGHTLY
COMMUNITY

## 2024-01-24 RX ORDER — CEFAZOLIN SODIUM 2 G/50ML
2 SOLUTION INTRAVENOUS
Status: DISCONTINUED | OUTPATIENT
Start: 2024-01-24 | End: 2024-01-24

## 2024-01-24 RX ADMIN — METHOCARBAMOL 500 MG: 500 TABLET ORAL at 10:01

## 2024-01-24 RX ADMIN — DEXAMETHASONE SODIUM PHOSPHATE 4 MG: 4 INJECTION, SOLUTION INTRA-ARTICULAR; INTRALESIONAL; INTRAMUSCULAR; INTRAVENOUS; SOFT TISSUE at 12:01

## 2024-01-24 RX ADMIN — EPHEDRINE SULFATE 10 MG: 50 INJECTION INTRAVENOUS at 01:01

## 2024-01-24 RX ADMIN — KETOROLAC TROMETHAMINE 15 MG: 30 INJECTION INTRAMUSCULAR; INTRAVENOUS at 06:01

## 2024-01-24 RX ADMIN — ONDANSETRON 4 MG: 2 INJECTION INTRAMUSCULAR; INTRAVENOUS at 01:01

## 2024-01-24 RX ADMIN — PHENYLEPHRINE HYDROCHLORIDE 100 MCG: 10 INJECTION INTRAVENOUS at 12:01

## 2024-01-24 RX ADMIN — HYDROMORPHONE HYDROCHLORIDE 0.4 MG: 2 INJECTION INTRAMUSCULAR; INTRAVENOUS; SUBCUTANEOUS at 02:01

## 2024-01-24 RX ADMIN — SODIUM CHLORIDE: 9 INJECTION, SOLUTION INTRAVENOUS at 06:01

## 2024-01-24 RX ADMIN — OXYCODONE HYDROCHLORIDE 10 MG: 10 TABLET ORAL at 11:01

## 2024-01-24 RX ADMIN — PHENYLEPHRINE HYDROCHLORIDE 300 MCG: 10 INJECTION INTRAVENOUS at 01:01

## 2024-01-24 RX ADMIN — KETOROLAC TROMETHAMINE 15 MG: 30 INJECTION INTRAMUSCULAR; INTRAVENOUS at 11:01

## 2024-01-24 RX ADMIN — EPHEDRINE SULFATE 5 MG: 50 INJECTION INTRAVENOUS at 01:01

## 2024-01-24 RX ADMIN — OXYCODONE HYDROCHLORIDE 10 MG: 10 TABLET ORAL at 09:01

## 2024-01-24 RX ADMIN — OXYCODONE HYDROCHLORIDE 10 MG: 10 TABLET ORAL at 04:01

## 2024-01-24 RX ADMIN — CEFAZOLIN SODIUM 2 G: 2 SOLUTION INTRAVENOUS at 12:01

## 2024-01-24 RX ADMIN — SODIUM CHLORIDE, SODIUM GLUCONATE, SODIUM ACETATE, POTASSIUM CHLORIDE AND MAGNESIUM CHLORIDE: 526; 502; 368; 37; 30 INJECTION, SOLUTION INTRAVENOUS at 12:01

## 2024-01-24 RX ADMIN — ENOXAPARIN SODIUM 40 MG: 40 INJECTION SUBCUTANEOUS at 08:01

## 2024-01-24 RX ADMIN — MIDAZOLAM HYDROCHLORIDE 2 MG: 1 INJECTION, SOLUTION INTRAMUSCULAR; INTRAVENOUS at 12:01

## 2024-01-24 RX ADMIN — METHOCARBAMOL 500 MG: 500 TABLET ORAL at 09:01

## 2024-01-24 RX ADMIN — METHOCARBAMOL 500 MG: 500 TABLET ORAL at 02:01

## 2024-01-24 RX ADMIN — ACETAMINOPHEN 325MG 650 MG: 325 TABLET ORAL at 06:01

## 2024-01-24 RX ADMIN — ACETAMINOPHEN 325MG 650 MG: 325 TABLET ORAL at 09:01

## 2024-01-24 RX ADMIN — CEFAZOLIN SODIUM 2 G: 2 SOLUTION INTRAVENOUS at 08:01

## 2024-01-24 RX ADMIN — PHENYLEPHRINE HYDROCHLORIDE 200 MCG: 10 INJECTION INTRAVENOUS at 12:01

## 2024-01-24 RX ADMIN — TRAZODONE HYDROCHLORIDE 100 MG: 50 TABLET ORAL at 10:01

## 2024-01-24 RX ADMIN — PHENYLEPHRINE HYDROCHLORIDE 200 MCG: 10 INJECTION INTRAVENOUS at 01:01

## 2024-01-24 RX ADMIN — ACETAMINOPHEN 325MG 650 MG: 325 TABLET ORAL at 02:01

## 2024-01-24 RX ADMIN — FENTANYL CITRATE 50 MCG: 50 INJECTION, SOLUTION INTRAMUSCULAR; INTRAVENOUS at 12:01

## 2024-01-24 RX ADMIN — ROCURONIUM BROMIDE 50 MG: 10 SOLUTION INTRAVENOUS at 12:01

## 2024-01-24 RX ADMIN — METHOCARBAMOL 500 MG: 500 TABLET ORAL at 06:01

## 2024-01-24 RX ADMIN — ENOXAPARIN SODIUM 40 MG: 40 INJECTION SUBCUTANEOUS at 09:01

## 2024-01-24 RX ADMIN — LIDOCAINE HYDROCHLORIDE 4 ML: 20 INJECTION, SOLUTION EPIDURAL; INFILTRATION; INTRACAUDAL; PERINEURAL at 12:01

## 2024-01-24 RX ADMIN — ALBUMIN (HUMAN) 200 ML: 12.5 SOLUTION INTRAVENOUS at 12:01

## 2024-01-24 RX ADMIN — PHENYLEPHRINE HYDROCHLORIDE 300 MCG: 10 INJECTION INTRAVENOUS at 12:01

## 2024-01-24 RX ADMIN — PROPOFOL 100 MG: 10 INJECTION, EMULSION INTRAVENOUS at 12:01

## 2024-01-24 RX ADMIN — ACETAMINOPHEN 325MG 650 MG: 325 TABLET ORAL at 10:01

## 2024-01-24 RX ADMIN — SUGAMMADEX 200 MG: 100 INJECTION, SOLUTION INTRAVENOUS at 01:01

## 2024-01-24 RX ADMIN — HYDROMORPHONE HYDROCHLORIDE 0.4 MG: 2 INJECTION INTRAMUSCULAR; INTRAVENOUS; SUBCUTANEOUS at 03:01

## 2024-01-24 NOTE — PROGRESS NOTES
"Patient Name: Che Kendall  MRN: [id number]  Admission Date: 1/23/2024  Hospital Length of Stay: 1 days  Attending Provider: Jhonny Whitt Jr., MD  Primary Care Provider: Melina Akins FNP  Follow-up For: Procedure(s) (LRB):  ORIF, FRACTURE, CALCANEUS (Left)    Post-Operative Day:    Subjective:       Principal Orthopedic Problem:  Left femoral neck fracture, left calcaneus fracture    Interval History:  No acute issues overnight.  Patient resting comfortably this morning.  Pain well controlled with medication.  In skin traction with splint to left heel.    Review of patient's allergies indicates:  No Known Allergies    Current Facility-Administered Medications   Medication    0.9%  NaCl infusion    acetaminophen tablet 650 mg    acetaminophen tablet 650 mg    enoxaparin injection 40 mg    melatonin tablet 6 mg    methocarbamoL tablet 500 mg    ondansetron disintegrating tablet 8 mg    ondansetron injection 4 mg    oxyCODONE immediate release tablet 5 mg    oxyCODONE immediate release tablet Tab 10 mg    trazodone split tablet 25 mg     No current outpatient medications on file.     Objective:     Vital Signs (Most Recent):  Temp: 97.2 °F (36.2 °C) (01/23/24 1200)  Pulse: 102 (01/24/24 0612)  Resp: 16 (01/24/24 0612)  BP: (!) 143/80 (01/24/24 0612)  SpO2: 96 % (01/23/24 1825) Vital Signs (24h Range):  Temp:  [97.2 °F (36.2 °C)] 97.2 °F (36.2 °C)  Pulse:  [] 102  Resp:  [14-23] 16  SpO2:  [96 %-98 %] 96 %  BP: (105-147)/(67-87) 143/80     Weight: 72.6 kg (160 lb)  Height: 5' 8" (172.7 cm)  Body mass index is 24.33 kg/m².    No intake or output data in the 24 hours ending 01/24/24 0718    Physical Exam:   Ortho/SPM Exam    General the patient is alert and oriented x3 no acute distress nontoxic-appearing appropriate affect.    Constitutional: Vital signs are examined and stable.  Resp: No signs of labored breathing    Musculoskeletal Exam:  Left lower extremity:  Splint clean and dry.  Brisk capillary " refill to all digits.  Mild swelling noted to the dorsum of the foot.  Palpable DP pulse.  Sensation light touch diminished globally but intact.  No attempted range motion of the hip performed.    Diagnostic Findings:   Significant Labs: BMP:   Recent Labs   Lab 01/24/24  0415      K 4.1   CO2 22*   BUN 12.2   CREATININE 0.85   CALCIUM 8.8     CBC:   Recent Labs   Lab 01/23/24  1328 01/24/24  0415   WBC 15.73* 9.83   HGB 13.2 12.7   HCT 39.5 37.6    265     CMP:   Recent Labs   Lab 01/23/24  1328 01/24/24  0415    138   K 4.0 4.1   CO2 25 22*   BUN 14.6 12.2   CREATININE 0.90 0.85   CALCIUM 9.5 8.8   ALBUMIN 3.7 3.5   BILITOT 0.3 0.5   ALKPHOS 96 85   AST 16 22   ALT 9 9     All pertinent labs within the past 24 hours have been reviewed.        Significant Imaging: I have reviewed all pertinent imaging results/findings.     Assessment/Plan:     Active Diagnoses:    Diagnosis Date Noted POA    Closed fracture of neck of left femur [S72.002A] 01/23/2024 Unknown    Closed displaced fracture of left calcaneus [S92.002A] 01/23/2024 Unknown      Problems Resolved During this Admission:     The risks, benefits and alternatives treatment were discussed at length with the patient today including but not limited to pain, bleeding, scarring, infection, damage to neurovascular structures, malunion/nonunion, hardware failure/irritation, need for future procedures and complications leading to amputation and even death.  She will benefit from operative stabilization of her calcaneus.  We will do his percutaneous screw fixation.  She will also need a left total hip arthroplasty.  We will need to be done at our arthroplasty center of [facility] utilizing a robotic assisted methods with our board-certified arthroplasty specialist.  She will not be able to have her total hip performed today therefore we will plan for operative stabilization of her calcaneus and then transferred to [facility].  All questions and concerns were addressed with the patient her family they understand and agree with our plan.  Remain NPO.  Plan for surgery later this morning.    This note/OR report was created with the assistance of  voice recognition software or phone  dictation.  There may be transcription errors as a result of using this technology however minimal. Effort has been made to assure accuracy of transcription but any obvious errors or omissions should be clarified with the author of the document.             Constantin Selby MD  Orthopedic Trauma Surgery  [facility] - Emergency Dept

## 2024-01-24 NOTE — ANESTHESIA POSTPROCEDURE EVALUATION
Anesthesia Post Evaluation    Patient: Che Kendall    Procedure(s) Performed: Procedure(s) (LRB):  ORIF, FRACTURE, CALCANEUS (Left)    Final Anesthesia Type: general      Patient location during evaluation: PACU  Patient participation: Yes- Able to Participate  Level of consciousness: awake and alert  Post-procedure vital signs: reviewed and stable  Pain management: adequate  Airway patency: patent  YUMIKO mitigation strategies: Extubation while patient is awake  PONV status at discharge: No PONV  Anesthetic complications: no      Cardiovascular status: blood pressure returned to baseline  Respiratory status: unassisted  Hydration status: euvolemic  Follow-up not needed.              Vitals Value Taken Time   /75 01/24/24 1412   Temp 36.6 °C (97.9 °F) 01/24/24 1338   Pulse 114 01/24/24 1412   Resp 20 01/24/24 1412   SpO2 90 % 01/24/24 1412   Vitals shown include unvalidated device data.      No case tracking events are documented in the log.      Pain/Primo Score: Pain Rating Prior to Med Admin: 7 (1/24/2024  2:07 PM)  Primo Score: 9 (1/24/2024  2:00 PM)

## 2024-01-24 NOTE — ANESTHESIA PROCEDURE NOTES
Intubation    Date/Time: 1/24/2024 12:07 PM    Performed by: Miguel Angel Mccarthy CRNA  Authorized by: Adrien Orellana MD    Intubation:     Induction:  Intravenous    Intubated:  Postinduction    Mask Ventilation:  Easy mask    Attempts:  1    Attempted By:  Student    Method of Intubation:  Direct    Blade:  Steven 3    Laryngeal View Grade: Grade I - full view of cords      Difficult Airway Encountered?: No      Complications:  None    Airway Device:  Oral endotracheal tube    Airway Device Size:  7.0    Style/Cuff Inflation:  Cuffed (inflated to minimal occlusive pressure)    Tube secured:  21    Secured at:  The lips    Placement Verified By:  Capnometry    Complicating Factors:  None    Findings Post-Intubation:  BS equal bilateral and atraumatic/condition of teeth unchanged

## 2024-01-24 NOTE — TERTIARY TRAUMA SURVEY NOTE
TERTIARY TRAUMA SURVEY (TTS)    List Injuries Identified to Date:   1. Left Femoral neck fracture  2. Comminuted intra articular calcaneal fracture    []Problems list reviewed  List Operations and Procedures:   1. ORIF Calcaneal fracture    Past Surgical History:   Procedure Laterality Date    APPENDECTOMY      AUGMENTATION OF BREAST Bilateral     CARDIAC CATHETERIZATION      WNL     SECTION      OOPHORECTOMY Right     age 15    right meniscus repair         Incidental findings:   1. N/a    Past Medical History:   1. Depression    Active Ambulatory Problems     Diagnosis Date Noted    No Active Ambulatory Problems     Resolved Ambulatory Problems     Diagnosis Date Noted    No Resolved Ambulatory Problems     Past Medical History:   Diagnosis Date    Anxiety disorder, unspecified     Depression     Femur fracture, left     Fracture, foot, left     Hypercholesteremia     PONV (postoperative nausea and vomiting)      Past Medical History:   Diagnosis Date    Anxiety disorder, unspecified     Depression     Femur fracture, left     Fracture, foot, left     Hypercholesteremia     PONV (postoperative nausea and vomiting)        Tertiary Physical Exam:     Physical Exam  Constitutional:       Appearance: She is normal weight.   HENT:      Head: Normocephalic and atraumatic.      Nose: Nose normal.      Mouth/Throat:      Mouth: Mucous membranes are moist.   Eyes:      Extraocular Movements: Extraocular movements intact.      Pupils: Pupils are equal, round, and reactive to light.   Cardiovascular:      Rate and Rhythm: Normal rate and regular rhythm.   Pulmonary:      Effort: Pulmonary effort is normal.      Breath sounds: Normal breath sounds.   Abdominal:      General: Abdomen is flat. There is no distension.      Tenderness: There is no abdominal tenderness.   Musculoskeletal:      Cervical back: Normal range of motion.      Comments: TTP left hip, left medial aspect of ankle with overlying ecchymotic  changes, tender to palpation.    Neurological:      Mental Status: She is alert.         Imaging Review:     Imaging Results              CT Hip w/o Contrast Left w/Kostas Protocol (Final result)  Result time 01/23/24 20:16:31      Final result by Lyle Guillen MD (01/23/24 20:16:31)                   Impression:      CT for surgical planning.      Electronically signed by: Lyle Guillen  Date:    01/23/2024  Time:    20:16               Narrative:    EXAMINATION:  CT HIP WITHOUT CONTRAST LEFT W/KOSTAS PROTOCOL    CLINICAL HISTORY:  left femoral neck fracture;Fracture of unspecified part of neck of left femur, initial encounter for closed fracture    TECHNIQUE:  Helical CT imaging through the pelvis was obtained with axial 0.75 mm reconstructed images reviewed. Helical CT imaging through the knees were also performed with 3 mm axial reconstructed images reviewed. No IV or intra-articular contrast was administered.    Total  mGycm.  Automated exposure control was utilized to minimize radiation dose.    COMPARISON:  Left femur radiographs January 23, 2024    FINDINGS:  There is left femoral subcapital fracture.  There is proximal retraction of the femoral neck.  Femoral head is situated within the acetabulum.  No intrinsic osseous lesion is appreciated.  Limited intrapelvic evaluation reveals no acute process.  There is noninflamed diverticulosis coli.                                       CT Head Without Contrast (Final result)  Result time 01/23/24 15:30:22      Final result by Yan Matias MD (01/23/24 15:30:22)                   Impression:      No acute intracranial process identified.      Electronically signed by: Yan Matias  Date:    01/23/2024  Time:    15:30               Narrative:    EXAMINATION:  CT HEAD WITHOUT CONTRAST    CLINICAL HISTORY:  Trauma;    TECHNIQUE:  CT images of the head without IV contrast. Axial, coronal and sagittal images reviewed. Dose length product 1295 mGycm. Automatic  exposure control, adjustment of mA/kV or iterative reconstruction technique used to limit radiation dose.    COMPARISON:  CT 03/05/2014    FINDINGS:  Extra-axial spaces/ventricular system: Normal for age.    Intracranial hemorrhage: None identified.    Cerebral parenchyma: No acute large vessel territory infarct or mass effect identified.    Vascular system: No hyperdense vessel appreciated.    Cerebellum: Normal.    Sella: Normal.    Included paranasal sinuses and mastoid air cells: Well-aerated.    Visualized orbits: Normal.    Scalp/Calvarium: No depressed skull fracture.                                       CT Cervical Spine Without Contrast (Final result)  Result time 01/23/24 15:39:33      Final result by Yan Matias MD (01/23/24 15:39:33)                   Impression:      No acute cervical spine fracture or traumatic malalignment identified.      Electronically signed by: Yan Matias  Date:    01/23/2024  Time:    15:39               Narrative:    EXAMINATION:  CT CERVICAL SPINE WITHOUT CONTRAST    CLINICAL HISTORY:  Trauma;    TECHNIQUE:  Noncontrast CT images of the cervical spine. Axial, coronal, and sagittal reformatted images reviewed. Dose length product is 1295 mGycm. Automatic exposure control, adjustment of mA/kV or iterative reconstruction technique used to limit radiation dose.    COMPARISON:  No relevant comparison studies available at the time of dictation.    FINDINGS:  Fractures: No acute fracture identified.    Alignment: Mild reversal of the cervical lordosis superiorly.  Minimal anterolisthesis retrolisthesis of C5 on C6, likely degenerative.    Prevertebral soft tissues: Within normal limits.    Degenerative changes: Osteophytosis and moderate to severe disc space narrowing in the mid to lower cervical spine.  Disc space narrowing greatest at C5-6.  Mild facet arthropathy on the left at C4-5.    Incidental findings: None identified.                                       CT Ankle  (Including Hindfoot) Without Contrast Left (Final result)  Result time 01/23/24 15:41:25      Final result by Bentley Castillo MD (01/23/24 15:41:25)                   Impression:      A highly comminuted intra-articular calcaneal fracture is present with relative loss of Boehler's angle and approximately 10 mm cranial displacement and impaction of the calcaneal tuberosity    The distal tibia and fibula are intact.  The syndesmosis does not appear widened.      Electronically signed by: Bentley Castillo  Date:    01/23/2024  Time:    15:41               Narrative:    EXAMINATION:  CT ANKLE (INCLUDING HINDFOOT) WITHOUT CONTRAST LEFT    CLINICAL HISTORY:  Fracture, ankle;    TECHNIQUE:  Unenhanced axial images of the left ankle were obtained along with coronal and sagittal reconstructions.  Automatic exposure control was used to reduce radiation exposure to the patient.    COMPARISON:  CT scan of the foot used for comparison dated 01/23/2024.  Radiographs of the ankle used for comparison dated 01/23/2024    FINDINGS:  A highly comminuted intra-articular calcaneal fracture is present with relative loss of Boehler's angle and approximately 10 mm cranial displacement and impaction of the calcaneal tuberosity.  The talar dome is intact.  The syndesmosis is preserved.  The medial and lateral malleoli are intact.  The ankle joint space is relatively symmetric.    The study is not optimized to evaluate the ligaments and soft tissues but the anterior talofibular ligament is partially visualized on image 49 of series 2 and is likely intact.  The deltoid ligament complex is likely intact.  The tendons do not appear discretely torn.  Subcutaneous edema is present about the ankle and hindfoot.  Small ankle and subtalar joint effusions are present.                                       CT Foot Without Contrast Left (Final result)  Result time 01/23/24 15:38:08      Final result by Bentley Castillo MD (01/23/24 15:38:08)                    Impression:      A highly comminuted intra-articular calcaneal fracture is present with fracture lines traversing all 3 facets and a relative loss of Boehler's angle. The calcaneal tuberosity fragment is cranially displaced and impacted by approximately 10 mm.      Electronically signed by: Bentley Castillo  Date:    01/23/2024  Time:    15:38               Narrative:    EXAMINATION:  CT FOOT WITHOUT CONTRAST LEFT    CLINICAL HISTORY:  Fracture, foot;    TECHNIQUE:  Unenhanced axial images of the left foot were obtained along with coronal and sagittal reconstructions.  Automatic exposure control was used to reduce radiation exposure to the patient.    COMPARISON:  Radiographs left foot used for comparison dated 01/23/2024    FINDINGS:  A highly comminuted intra-articular calcaneal fracture is present with fracture lines traversing all 3 facets and a relative loss of Boehler's angle.  The calcaneal tuberosity fragment is cranially displaced and impacted by approximately 10 mm.    The talus is intact.  The base of the 2nd metatarsal is well aligned with the middle cuneiform.  Tarsometatarsal joints are preserved.  Metatarsophalangeal joints are preserved.  The syndesmosis does not appear widened.  The medial and lateral malleoli are intact.    The study is not optimized to evaluate the soft tissues however the the tendons about the ankle are relatively well visualized and do not appear discretely torn.  The plantar fascia is intact.  Small ankle and subtalar joint effusions are present.                                       CT Chest Abdomen Pelvis With IV Contrast (XPD) NO Oral Contrast (Final result)  Result time 01/23/24 15:44:22      Final result by Estela Perez MD (01/23/24 15:44:22)                   Impression:      Transcervical left femoral neck fracture.      Electronically signed by: Estela Perez  Date:    01/23/2024  Time:    15:44               Narrative:    EXAMINATION:  CT CHEST ABDOMEN  PELVIS WITH IV CONTRAST (XPD)    CLINICAL HISTORY:  Trauma;    TECHNIQUE:  Helical acquisition with axial, sagittal and coronal reformations obtained from the thoracic inlet to the pubic symphysis following the IV administration of contrast.    Automated tube current modulation, weight-based exposure dosing, and/or iterative reconstruction technique utilized to reach lowest reasonably achievable exposure rate.    DLP: 403 mGy*cm    COMPARISON:  No relevant priors available for comparison at time of this dictation    FINDINGS:  BASE OF NECK: No significant abnormality.    HEART: Normal size. No effusion. There are coronary artery calcifications.    THORACIC VASCULATURE: Mild aortic atherosclerosis.  Ascending aorta measures 3 cm.  Pulmonary artery measures 3.3 cm..    RUTH/MEDIASTINUM: No enlarged lymph nodes by size criteria.    AIRWAYS: Patent.    LUNGS/PLEURA: No pleural effusion or pneumothorax.  Mild biapical scarring.    THORACIC SOFT TISSUES: Bilateral breast implants.    LIVER: Normal attenuation. No appreciable focal hepatic lesion.    BILIARY: No calcified gallstones.    PANCREAS: No inflammatory change.    SPLEEN: Normal in size    ADRENALS: No mass.    KIDNEYS/URETERS: The kidneys enhance symmetrically.  No hydronephrosis.    GI TRACT/MESENTERY:  No evidence of bowel obstruction or inflammation.    PERITONEUM: No free fluid.No free air.    LYMPH NODES: No enlarged lymph nodes by size criteria.    ABDOMINOPELVIC VASCULATURE: Aortoiliac atherosclerosis.  There is reflux into the left gonadal vein.    BLADDER: Normal appearance given degree of distention.    REPRODUCTIVE ORGANS: Normal as visualized.    ABDOMINAL WALL: Unremarkable.    BONES: Trace anterolisthesis of T2 on T3.  Trace retrolisthesis of L2 on L3.  Degenerative change at the lumbar spine.  Impacted left femoral neck transcervical fracture.                                       X-Ray Femur 2 View Left (Final result)  Result time 01/23/24  13:44:33      Final result by Garrison Auguste MD (01/23/24 13:44:33)                   Impression:      As above.      Electronically signed by: Garrison Auguste  Date:    01/23/2024  Time:    13:44               Narrative:    EXAMINATION:  XR FEMUR 2 VIEW LEFT    CLINICAL HISTORY:  fall;    TECHNIQUE:  Two views of the left femur.    COMPARISON:  No prior imaging available for comparison    FINDINGS:  Impacted fracture of the left femoral neck.  No additional fracture appreciated.  Distal femur appears intact.                                       X-Ray Pelvis Routine AP (Final result)  Result time 01/23/24 13:22:59   Procedure changed from X-Ray Hip 2 or 3 views Left (with Pelvis when performed)     Final result by Garrison Auguste MD (01/23/24 13:22:59)                   Impression:      Impacted fracture of the left femoral neck.      Electronically signed by: Garrison Auguste  Date:    01/23/2024  Time:    13:22               Narrative:    EXAMINATION:  XR PELVIS ROUTINE AP    CLINICAL HISTORY:  fall;    TECHNIQUE:  Single view of the pelvis.    COMPARISON:  No prior imaging available for comparison    FINDINGS:  Sacroiliac joints are symmetric.  Pubic symphysis is congruent.  Impacted fracture of the left femoral neck.                                       X-Ray Foot Complete Left (Final result)  Result time 01/23/24 13:26:18      Final result by Garrison Auguste MD (01/23/24 13:26:18)                   Impression:      Appearance of minimally displaced fracture of the distal calcaneus on lateral image with additional fracture of the 3rd proximal metatarsal as there is cortical irregularity. The metatarsal fracture is only seen on a single image.  Correlate with region of pain.      Electronically signed by: Garrison Auguste  Date:    01/23/2024  Time:    13:26               Narrative:    EXAMINATION:  XR FOOT COMPLETE 3 VIEW LEFT    CLINICAL HISTORY:  Unspecified fall, initial  encounter    TECHNIQUE:  Radiographs of the left foot with AP, lateral and oblique  views.    COMPARISON:  No prior imaging available for comparison    FINDINGS:  Appearance of minimally displaced fracture of the distal calcaneus on lateral image with additional fracture of the 3rd proximal metatarsal as there is cortical irregularity.  The metatarsal fracture is only seen on a single image.  Correlate with region of pain.                                       X-Ray Chest AP Portable (Final result)  Result time 01/23/24 13:16:28      Final result by Garrison Auguste MD (01/23/24 13:16:28)                   Impression:      No acute cardiopulmonary process.      Electronically signed by: Garrison Auguste  Date:    01/23/2024  Time:    13:16               Narrative:    EXAMINATION:  XR CHEST AP PORTABLE    CLINICAL HISTORY:  fall;    TECHNIQUE:  Single view of the chest    COMPARISON:  07/04/2012    FINDINGS:  No focal opacification, pleural effusion, or pneumothorax.    The cardiomediastinal silhouette is within normal limits.    No acute osseous abnormality.                                       X-Ray Ankle Complete Left (Final result)  Result time 01/23/24 13:35:13      Final result by Garrison Auguste MD (01/23/24 13:35:13)                   Impression:      As above      Electronically signed by: Garrison Auguste  Date:    01/23/2024  Time:    13:35               Narrative:    EXAMINATION:  XR ANKLE COMPLETE 3 VIEW LEFT    CLINICAL HISTORY:  Unspecified fall, initial encounter    TECHNIQUE:  Radiographs of the left ankle with AP, lateral and oblique  views.    COMPARISON:  01/23/2024    FINDINGS:  Irregularity at the distal aspect of the spleen ankle possibly represents chronic fracture.  This is unknown.  Recommend further evaluation with CT.  There is significant soft tissue edema.  The bilateral malleoli appear intact.  No definite metatarsal fracture noted.                                       Lab Review:  "  CBC:  Recent Labs   Lab Result Units 01/23/24  1328 01/24/24  0415   WBC x10(3)/mcL 15.73* 9.83   RBC x10(6)/mcL 4.07* 3.90*   Hgb g/dL 13.2 12.7   Hct % 39.5 37.6   Platelet x10(3)/mcL 285 265   MCV fL 97.1* 96.4*   MCH pg 32.4* 32.6*   MCHC g/dL 33.4 33.8       CMP:  Recent Labs   Lab Result Units 01/23/24  1328 01/24/24  0415   Calcium Level Total mg/dL 9.5 8.8   Albumin Level g/dL 3.7 3.5   Sodium Level mmol/L 137 138   Potassium Level mmol/L 4.0 4.1   Carbon Dioxide mmol/L 25 22*   Blood Urea Nitrogen mg/dL 14.6 12.2   Creatinine mg/dL 0.90 0.85   Alkaline Phosphatase unit/L 96 85   Alanine Aminotransferase unit/L 9 9   Aspartate Aminotransferase unit/L 16 22   Bilirubin Total mg/dL 0.3 0.5       Troponin:  No results for input(s): "TROPONINI" in the last 2160 hours.    ETOH:  No results for input(s): "ETHANOL" in the last 72 hours.     Urine Drug Screen:  No results for input(s): "COCAINE", "OPIATE", "BARBITURATE", "AMPHETAMINE", "FENTANYL", "CANNABINOIDS", "MDMA" in the last 72 hours.    Invalid input(s): "BENZODIAZEPINE", "PHENCYCLIDINE"     TRACY Risk Assessment:   TRACY Risk Factors: *Minimum score 0; Maximum score 21*  Total score: 0  Plan:   To OR with ortho today for ORIF calcaneal fracture. Will likely be transferred to [facility] [place] [facility] for [place].    "

## 2024-01-24 NOTE — ANESTHESIA PREPROCEDURE EVALUATION
2024  Che Kendall is a 68 y.o., female who fell from a ladder and sustained Closed displaced fracture of her Left Calcaneus and .  Left Femoral Neck fracture.  Diagnosis: Closed displaced fracture of body of left calcaneus, initial encounter [S92.012A]   Pre-op diagnosis: Closed displaced fracture of body of left calcaneus, initial encounter [S92.012A]         The pt. comes to [facility] for the noted procedure under GET[facility].  Procedure: ORIF, FRACTURE, CALCANEUS (Left: Foot) - Lateral, vascular bed, moon foam, tourniquet  4th case  synthes large [facility] and VA calc plates   Anesthesia type: Gen ETT         PMHx:  PONV (postoperative nausea and vomiting)    Other Medical History   Hypercholesteremia Anxiety disorder, unspecified   Depression Fracture, foot, left   Femur fracture, left      Surgical History:  right meniscus repair AUGMENTATION OF BREAST   APPENDECTOMY  SECTION   OOPHORECTOMY CARDIAC CATHETERIZATION         Vital signs:        Lab Data:              EKG:  Sinus Tachycardia. Nonsp. ST abn. Rt: 102        Pre-op Assessment    I have reviewed the Patient Summary Reports.     I have reviewed the Nursing Notes. I have reviewed the NPO Status.   I have reviewed the Medications.     Review of Systems  Anesthesia Hx:  No problems with previous Anesthesia                Social:  Non-Smoker       Hematology/Oncology:  Hematology Normal   Oncology Normal                                   EENT/Dental:  EENT/Dental Normal           Cardiovascular:  Cardiovascular Normal Exercise tolerance: good                   Functional Capacity good / => 4 METS                         Pulmonary:  Pulmonary Normal                       Renal/:  Renal/ Normal                 Hepatic/GI:  Hepatic/GI Normal                 Musculoskeletal:  Musculoskeletal Normal                Neurological:  Neurology Normal                                       Endocrine:  Endocrine Normal            Dermatological:  Skin Normal    Psych:  Psychiatric Normal                    Physical Exam  General: Alert, Oriented, Well nourished and Cooperative    Airway:  Mallampati: II   Mouth Opening: Normal  TM Distance: Normal  Tongue: Normal  Neck ROM: Normal ROM    Dental:  Intact    Chest/Lungs:  Clear to auscultation, Normal Respiratory Rate    Heart:  Rate: Normal  Rhythm: Regular Rhythm        Anesthesia Plan  Type of Anesthesia, risks & benefits discussed:    Anesthesia Type: Gen ETT  Intra-op Monitoring Plan: Standard ASA Monitors  Post Op Pain Control Plan: IV/PO Opioids PRN  Induction:  IV and Inhalation  Airway Plan: Direct  Informed Consent: Informed consent signed with the Patient and all parties understand the risks and agree with anesthesia plan.  All questions answered. Patient consented to blood products? Yes  ASA Score: 2  Day of Surgery Review of History & Physical: H&P Update referred to the surgeon/provider.    Ready For Surgery From Anesthesia Perspective.     .

## 2024-01-24 NOTE — OP NOTE
[facility]                       [address] [id number]    PATIENT NAME:      TO GILL  YOB: 1955  CSN:               [id number]  MRN:               [id number]  ADMIT DATE:        01/23/2024 12:26:00  PHYSICIAN:         Constantin Selby MD                          OPERATIVE REPORT      DATE OF SURGERY:    01/24/2024 00:00:00    SURGEON:  Constantin Selby MD    PREOPERATIVE DIAGNOSIS:  Left intra-articular calcaneus fracture.    POSTOPERATIVE DIAGNOSIS:  Left intra-articular calcaneus fracture.    PROCEDURE:  Open reduction internal fixation of left calcaneus body fracture.    ANESTHESIA:  General.    ESTIMATED BLOOD LOSS:  25 mL.    TOURNIQUET TIME:  35 minutes.    IMPLANTS:  Synthes 4.5, continuous compression headless screws x2 as well as 6.5   continuous compression headless screws x2, and a 7.5 continuous compression   headless screw.    COMPLICATIONS:  None.    COUNTS:  All counts correct x2 at the end of the case.    INDICATIONS FOR PROCEDURE:  The patient is a 68-year-old female, who had a fall   from a ladder.  She sustained a left intra-articular calcaneus fracture as well   as a left femoral neck fracture.  She will require left total hip arthroplasty.    We will plan to transfer her over to our joint replacement center of excellence   to be managed by one of our board-certified arthroplasty specialist with the   [employer] robotic assisted joint replacement system.  Prior to her transfer, the   risks and benefits of treatment were discussed and we are bringing her to the   operating room for stabilization of her calcaneus fracture today.    PROCEDURE IN DETAIL:  After informed consent was obtained, the patient was met   in the preoperative holding area and her site was marked.  She was taken to the   operating room.  She was placed supine on the operating table and general   anesthesia was  induced.  All bony prominences were well padded.  She was turned   to the right lower cubitus position, stabilized on a bean bag.  The left lower   extremity was prepped and draped in standard sterile fashion.  A time-out was   done indicating correct operative limb and procedure.  The limb was   exsanguinated.  The tourniquet was raised.  Incision was made in the lateral   aspect of the heel was centered at the subtalar joint, is spread down to bone.    We utilized a [place] elevator in order to elevate the posterior facet.  Two 4.5   mm screws were then placed across the posterior facet joint, capturing the   fragment and compressing it to the constant fragment.  She had a slight varus   alignment that was corrected with a pin in the tuberosity.  Multiple wires were   then placed for larger screws, 2 which were used to buttress the joint surface,   another was put into the anterior process for stabilization of the body.  They   were all confirmed to be in appropriate position on the lateral and axial   imaging.  The wounds were thoroughly irrigated.  Tourniquet was released.    Hemostasis was obtained.  The layered closure was performed with 2-0 Monocryl,   3-0 nylon, Xeroform, 4 x 4's, cast padding, ABD, Ace bandage, and a Cam boot   were applied.  The patient was awakened, extubated, taken to recovery in stable   condition.    POSTOPERATIVE PLAN:  She will be admitted back to the Trauma team.  We will plan   for transfer today or tomorrow.  Lovenox DVT prophylaxis.  Ancef for 24 hours.        ______________________________  MD SILVINO Omalley/AIDAN  DD:  01/24/2024  Time:  01:20PM  DT:  01/24/2024  Time:  05:05PM  Job #:  [id number]      OPERATIVE REPORT

## 2024-01-24 NOTE — CONSULTS
Chief Complaint:   Chief Complaint   Patient presents with    Fall     Pt fell 8ft off ladder pta. Pt first hit left ankle then left hip. Pt reports decreased sensation to left foot. Pt denies back pain or tenderness on palpation. Swelling and bruising noted to left foot/ankle. 2+ pedal pulse b/l. Pt denies neck pain or tenderness on palpation.       History of present illness:  68-year-old female presents for evaluation of left hip pain as well as left leg pain.  Patient was on a ladder changing a light bulb when she fell roughly 8 ft.  Patient landed on her left side.  Complaining of significant left foot pain as well as significant left hip pain.  Denies any loss of consciousness.  Otherwise without complaints of pain.  She has been evaluated and admitted to the Trauma Service.  X-rays noted to have a left displaced femoral neck fracture as well as left calcaneus fracture.  Orthopedic consult for the above injuries.    No past medical history on file.    No past surgical history on file.    Current Facility-Administered Medications   Medication    0.9%  NaCl infusion    acetaminophen tablet 650 mg    acetaminophen tablet 650 mg    enoxaparin injection 40 mg    melatonin tablet 6 mg    ondansetron disintegrating tablet 8 mg    ondansetron injection 4 mg    oxyCODONE immediate release tablet 5 mg    oxyCODONE immediate release tablet Tab 10 mg    trazodone split tablet 25 mg     No current outpatient medications on file.       Review of patient's allergies indicates:  No Known Allergies    No family history on file.    Social History     Socioeconomic History    Marital status:            Review of Systems:    Constitution: Negative for chills, fever, and sweats.  Negative for unexplained weight loss.    HENT:  Negative for headaches and blurry vision.    Cardiovascular:Negative for chest pain or irregular heart beat. Negative for hypertension.    Respiratory:  Negative for cough and shortness of  breath.    Gastrointestinal: Negative for abdominal pain, heartburn, melena, nausea, and vomitting.    Genitourinary:  Negative bladder incontinence and dysuria.    Musculoskeletal:  See HPI    Neurological: Negative for numbness.    Psychiatric/Behavioral: Negative for depression.  The patient is not nervous/anxious.      Endocrine: Negative for polyuria    Hematologic/Lymphatic: Negative for bleeding problem.  Does not bruise/bleed easily.    Skin: Negative for poor would healing and rash      Physical Examination:    Vital Signs:    Vitals:    01/23/24 1825   BP: 135/80   Pulse: 96   Resp: 17   Temp:        Body mass index is 24.33 kg/m².    General: No acute distress, alert and oriented, healthy appearing    HEENT: Head is atraumatic, mucous membranes are moist    Neck: Supples, no JVD    Cardiovascular: Palpable dorsalis pedis and posterior tibial pulses, regular rate and rhythm to those pulses    Lungs: Breathing non-labored    Skin: no rashes appreciated    Neurologic: Can flex and extend knees, ankles, and toes. Sensation is grossly intact    Range of motion left hip and foot not checked given underlying fracture.  Patient complaining of significant pain to the left hip.  Positive FHL and EHL.  She has significant swelling of the left foot and calcaneus.  No bleeding.  No significant abrasions.  Underlying ecchymosis.    X-rays:  Three views left hip reviewed.  Patient with displaced left femoral neck fracture.  With regards to her left calcaneus, she has depressed intra-articular calcaneus fracture.  Calcaneus slightly shortened     Assessment::  Left calcaneus fracture  Left displaced femoral neck fracture    Plan:  Discussed all treatment options the patient.  Likely non op treatment of left calcaneus.  We will place her in a well-padded short-leg splint.  Discuss this with my trauma colleagues to discuss possible operative intervention.  If it would require operative intervention, we would likely  performed at a later date given significant soft tissue swelling.  Nonweightbearing left lower extremity.  With regards to the left hip, she has a displaced left femoral neck fracture.  This would benefit from hemiarthroplasty versus total hip arthroplasty.  Given the patient's young age as well as activity level would recommend total hip arthroplasty.  We will follow up the patient's final scans as well as tertiary survey to confirm no further injuries.  If she has no other injuries from a trauma surgery standpoint requiring other intervention from adjacent surfaces, we will plan for transferred to the orthopedic service as [facility] as [facility] for total hip arthroplasty using the [employer] robotics.  This can be performed at our center excellent at the [facility] given her access to robotics at that location.  We will follow-up her tertiary survey tomorrow as well as discuss the possible transfer with the trauma team if amenable.  Total hip arthroplasty were discussed in detail with the patient.  All questions answered to the patient's satisfaction.  No guarantees made.    This note was created using [employer] voice recognition software that occasionally misinterpreted phrases or words.    Consult note is delivered via Epic messaging service.

## 2024-01-24 NOTE — BRIEF OP NOTE
[facility] - Periop Services  Brief Operative Note    SUMMARY     Surgery Date: 1/24/2024     Surgeon(s) and Role:     * Constantin Selby MD - Primary    Assisting Surgeon: None    Pre-op Diagnosis:  Closed displaced fracture of body of left calcaneus, initial encounter [S92.012A]    Post-op Diagnosis:  Post-Op Diagnosis Codes:     * Closed displaced fracture of body of left calcaneus, initial encounter [S92.012A]    Procedure(s) (LRB):  ORIF, FRACTURE, CALCANEUS (Left)    Anesthesia: General    Implants:  Implant Name Type Inv. Item Serial No.  Lot No. LRB No. Used Action   4.5MM CANNULATED COMPRESSION HEADLESS SCREW, 40MM    [employer]  Left 1 Implanted   SCREW COMPR HDLSS LT 4.5X44MM - [id number]  SCREW COMPR HDLSS LT 4.5X44MM  [employer]  Left 1 Implanted   6.5MM CANNULATED COMPRESSION HEADLESS SCREW, 40MM    [employer]  Left 1 Implanted   6.5MM CNNULATED COMPRESSION HEADLESS SCREW, 45MM    [employer]  Left 1 Implanted   7.5MM CANNULATED COMPRESSION HEADLESS SCREW, 65MM    [employer]  Left 1 Implanted       Operative Findings: see op report    Estimated Blood Loss: 25 mL    Estimated Blood Loss has been documented.         Specimens:   Specimen (24h ago, onward)      None            [id number]  A/P: Tolerated procedure well. Admit to trauma team. NWB LLE. Plan for L STEPHON tomorrow or Friday. Ok for lovenox for DVT ppx. Ancef for 24hrs.      Constantin Selby MD  Orthopedic Trauma  [facility]

## 2024-01-24 NOTE — TRANSFER OF CARE
"Anesthesia Transfer of Care Note    Patient: Che Kendall    Procedure(s) Performed: Procedure(s) (LRB):  ORIF, FRACTURE, CALCANEUS (Left)    Patient location: PACU    Anesthesia Type: general    Transport from OR: Transported from OR on room air with adequate spontaneous ventilation    Post pain: adequate analgesia    Post assessment: no apparent anesthetic complications and tolerated procedure well    Post vital signs: stable    Nausea/Vomiting: no nausea/vomiting    Complications: none    Transfer of care protocol was followed      Last vitals: Visit Vitals  /68 (BP Location: Left arm, Patient Position: Lying)   Pulse 105   Temp 36.2 °C (97.2 °F)   Resp 20   Ht 5' 8" (1.727 m)   Wt 65.8 kg (145 lb)   SpO2 (!) 93%   BMI 22.05 kg/m²     "

## 2024-01-25 ENCOUNTER — ANESTHESIA EVENT (OUTPATIENT)
Dept: SURGERY | Facility: HOSPITAL | Age: 69
DRG: 522 | End: 2024-01-25
Payer: MEDICARE

## 2024-01-25 ENCOUNTER — ANESTHESIA (OUTPATIENT)
Dept: SURGERY | Facility: HOSPITAL | Age: 69
DRG: 522 | End: 2024-01-25
Payer: MEDICARE

## 2024-01-25 LAB
HCT VFR BLD AUTO: 29.5 % (ref 37–47)
HGB BLD-MCNC: 9.9 G/DL (ref 12–16)

## 2024-01-25 PROCEDURE — 36000713 HC OR TIME LEV V EA ADD 15 MIN: Performed by: SPECIALIST

## 2024-01-25 PROCEDURE — 63600175 PHARM REV CODE 636 W HCPCS: Performed by: ORTHOPAEDIC SURGERY

## 2024-01-25 PROCEDURE — 63600175 PHARM REV CODE 636 W HCPCS

## 2024-01-25 PROCEDURE — 25000003 PHARM REV CODE 250: Performed by: SPECIALIST

## 2024-01-25 PROCEDURE — 27201423 OPTIME MED/SURG SUP & DEVICES STERILE SUPPLY: Performed by: SPECIALIST

## 2024-01-25 PROCEDURE — 97162 PT EVAL MOD COMPLEX 30 MIN: CPT

## 2024-01-25 PROCEDURE — 71000033 HC RECOVERY, INTIAL HOUR: Performed by: SPECIALIST

## 2024-01-25 PROCEDURE — 25000003 PHARM REV CODE 250: Performed by: ANESTHESIOLOGY

## 2024-01-25 PROCEDURE — 25000003 PHARM REV CODE 250: Performed by: ORTHOPAEDIC SURGERY

## 2024-01-25 PROCEDURE — 0SRB0JA REPLACEMENT OF LEFT HIP JOINT WITH SYNTHETIC SUBSTITUTE, UNCEMENTED, OPEN APPROACH: ICD-10-PCS | Performed by: SPECIALIST

## 2024-01-25 PROCEDURE — A4216 STERILE WATER/SALINE, 10 ML: HCPCS | Performed by: SPECIALIST

## 2024-01-25 PROCEDURE — 25000003 PHARM REV CODE 250

## 2024-01-25 PROCEDURE — 37000008 HC ANESTHESIA 1ST 15 MINUTES: Performed by: SPECIALIST

## 2024-01-25 PROCEDURE — 99900035 HC TECH TIME PER 15 MIN (STAT)

## 2024-01-25 PROCEDURE — 88305 TISSUE EXAM BY PATHOLOGIST: CPT | Performed by: SPECIALIST

## 2024-01-25 PROCEDURE — 94761 N-INVAS EAR/PLS OXIMETRY MLT: CPT

## 2024-01-25 PROCEDURE — 0055T BONE SRGRY CMPTR CT/MRI IMAG: CPT | Mod: ,,, | Performed by: SPECIALIST

## 2024-01-25 PROCEDURE — 11000001 HC ACUTE MED/SURG PRIVATE ROOM

## 2024-01-25 PROCEDURE — D9220A PRA ANESTHESIA: Mod: ANES,,, | Performed by: ANESTHESIOLOGY

## 2024-01-25 PROCEDURE — 27130 TOTAL HIP ARTHROPLASTY: CPT | Mod: AS,LT,, | Performed by: PHYSICIAN ASSISTANT

## 2024-01-25 PROCEDURE — C1776 JOINT DEVICE (IMPLANTABLE): HCPCS | Performed by: SPECIALIST

## 2024-01-25 PROCEDURE — 94799 UNLISTED PULMONARY SVC/PX: CPT | Mod: XB

## 2024-01-25 PROCEDURE — 85018 HEMOGLOBIN: CPT | Performed by: SPECIALIST

## 2024-01-25 PROCEDURE — 63600175 PHARM REV CODE 636 W HCPCS: Performed by: SPECIALIST

## 2024-01-25 PROCEDURE — 88311 DECALCIFY TISSUE: CPT

## 2024-01-25 PROCEDURE — 8E0Y0CZ ROBOTIC ASSISTED PROCEDURE OF LOWER EXTREMITY, OPEN APPROACH: ICD-10-PCS | Performed by: SPECIALIST

## 2024-01-25 PROCEDURE — 36000712 HC OR TIME LEV V 1ST 15 MIN: Performed by: SPECIALIST

## 2024-01-25 PROCEDURE — 37000009 HC ANESTHESIA EA ADD 15 MINS: Performed by: SPECIALIST

## 2024-01-25 PROCEDURE — D9220A PRA ANESTHESIA: Mod: CRNA,,,

## 2024-01-25 PROCEDURE — 27130 TOTAL HIP ARTHROPLASTY: CPT | Mod: LT,,, | Performed by: SPECIALIST

## 2024-01-25 PROCEDURE — C1713 ANCHOR/SCREW BN/BN,TIS/BN: HCPCS | Performed by: SPECIALIST

## 2024-01-25 DEVICE — HIP STEM - HIGH OFFSET
Type: IMPLANTABLE DEVICE | Site: HIP | Status: FUNCTIONAL
Brand: INSIGNIA

## 2024-01-25 DEVICE — CERAMIC V40 FEMORAL HEAD
Type: IMPLANTABLE DEVICE | Site: HIP | Status: FUNCTIONAL
Brand: BIOLOX

## 2024-01-25 DEVICE — IMPLANTABLE DEVICE: Type: IMPLANTABLE DEVICE | Site: HIP | Status: FUNCTIONAL

## 2024-01-25 DEVICE — TRIDENT X3 0 DEGREE POLYETHYLENE INSERT
Type: IMPLANTABLE DEVICE | Site: HIP | Status: FUNCTIONAL
Brand: TRIDENT X3 INSERT

## 2024-01-25 DEVICE — 6.5MM LOW PROFILE HEX SCREW 30MM
Type: IMPLANTABLE DEVICE | Site: HIP | Status: FUNCTIONAL
Brand: TRIDENT II

## 2024-01-25 DEVICE — TRIDENT II TRITANIUM CLUSTER 50D
Type: IMPLANTABLE DEVICE | Site: HIP | Status: FUNCTIONAL
Brand: TRIDENT II

## 2024-01-25 RX ORDER — SODIUM CHLORIDE 0.9 % (FLUSH) 0.9 %
SYRINGE (ML) INJECTION
Status: DISPENSED
Start: 2024-01-25 | End: 2024-01-25

## 2024-01-25 RX ORDER — ALUMINUM HYDROXIDE, MAGNESIUM HYDROXIDE, AND SIMETHICONE 1200; 120; 1200 MG/30ML; MG/30ML; MG/30ML
30 SUSPENSION ORAL EVERY 6 HOURS PRN
Status: DISCONTINUED | OUTPATIENT
Start: 2024-01-25 | End: 2024-01-29 | Stop reason: HOSPADM

## 2024-01-25 RX ORDER — METOCLOPRAMIDE HYDROCHLORIDE 5 MG/ML
10 INJECTION INTRAMUSCULAR; INTRAVENOUS
Status: COMPLETED | OUTPATIENT
Start: 2024-01-25 | End: 2024-01-26

## 2024-01-25 RX ORDER — CEFAZOLIN SODIUM 1 G/3ML
INJECTION, POWDER, FOR SOLUTION INTRAMUSCULAR; INTRAVENOUS
Status: DISCONTINUED | OUTPATIENT
Start: 2024-01-25 | End: 2024-01-25

## 2024-01-25 RX ORDER — GABAPENTIN 300 MG/1
300 CAPSULE ORAL NIGHTLY
Status: DISCONTINUED | OUTPATIENT
Start: 2024-01-25 | End: 2024-01-25

## 2024-01-25 RX ORDER — MORPHINE SULFATE 4 MG/ML
4 INJECTION, SOLUTION INTRAMUSCULAR; INTRAVENOUS
Status: ACTIVE | OUTPATIENT
Start: 2024-01-25 | End: 2024-01-26

## 2024-01-25 RX ORDER — DEXAMETHASONE SODIUM PHOSPHATE 4 MG/ML
INJECTION, SOLUTION INTRA-ARTICULAR; INTRALESIONAL; INTRAMUSCULAR; INTRAVENOUS; SOFT TISSUE
Status: DISCONTINUED | OUTPATIENT
Start: 2024-01-25 | End: 2024-01-25

## 2024-01-25 RX ORDER — ACETAMINOPHEN 500 MG
1000 TABLET ORAL
Status: DISCONTINUED | OUTPATIENT
Start: 2024-01-25 | End: 2024-01-25

## 2024-01-25 RX ORDER — KETOROLAC TROMETHAMINE 30 MG/ML
INJECTION, SOLUTION INTRAMUSCULAR; INTRAVENOUS
Status: DISCONTINUED | OUTPATIENT
Start: 2024-01-25 | End: 2024-01-25 | Stop reason: HOSPADM

## 2024-01-25 RX ORDER — ACETAMINOPHEN 10 MG/ML
INJECTION, SOLUTION INTRAVENOUS
Status: DISCONTINUED | OUTPATIENT
Start: 2024-01-25 | End: 2024-01-25

## 2024-01-25 RX ORDER — PHENYLEPHRINE HYDROCHLORIDE 10 MG/ML
INJECTION INTRAVENOUS
Status: DISCONTINUED | OUTPATIENT
Start: 2024-01-25 | End: 2024-01-25

## 2024-01-25 RX ORDER — TRANEXAMIC ACID 650 MG/1
1950 TABLET ORAL
Status: DISCONTINUED | OUTPATIENT
Start: 2024-01-25 | End: 2024-01-25

## 2024-01-25 RX ORDER — ONDANSETRON 4 MG/1
4 TABLET, ORALLY DISINTEGRATING ORAL
Status: DISCONTINUED | OUTPATIENT
Start: 2024-01-25 | End: 2024-01-25

## 2024-01-25 RX ORDER — KETAMINE HYDROCHLORIDE 100 MG/ML
INJECTION, SOLUTION INTRAMUSCULAR; INTRAVENOUS
Status: DISCONTINUED | OUTPATIENT
Start: 2024-01-25 | End: 2024-01-25

## 2024-01-25 RX ORDER — TALC
6 POWDER (GRAM) TOPICAL NIGHTLY PRN
Status: DISCONTINUED | OUTPATIENT
Start: 2024-01-25 | End: 2024-01-29 | Stop reason: HOSPADM

## 2024-01-25 RX ORDER — ENOXAPARIN SODIUM 100 MG/ML
40 INJECTION SUBCUTANEOUS
Status: DISCONTINUED | OUTPATIENT
Start: 2024-01-26 | End: 2024-01-29 | Stop reason: HOSPADM

## 2024-01-25 RX ORDER — LIDOCAINE HYDROCHLORIDE 10 MG/ML
INJECTION, SOLUTION EPIDURAL; INFILTRATION; INTRACAUDAL; PERINEURAL
Status: COMPLETED | OUTPATIENT
Start: 2024-01-25 | End: 2024-01-25

## 2024-01-25 RX ORDER — GABAPENTIN 300 MG/1
300 CAPSULE ORAL
Status: DISCONTINUED | OUTPATIENT
Start: 2024-01-25 | End: 2024-01-25

## 2024-01-25 RX ORDER — HYDROMORPHONE HYDROCHLORIDE 2 MG/ML
0.4 INJECTION, SOLUTION INTRAMUSCULAR; INTRAVENOUS; SUBCUTANEOUS EVERY 5 MIN PRN
Status: DISCONTINUED | OUTPATIENT
Start: 2024-01-25 | End: 2024-01-25

## 2024-01-25 RX ORDER — EPINEPHRINE 1 MG/ML
INJECTION, SOLUTION, CONCENTRATE INTRAVENOUS
Status: DISCONTINUED | OUTPATIENT
Start: 2024-01-25 | End: 2024-01-25 | Stop reason: HOSPADM

## 2024-01-25 RX ORDER — DOCUSATE SODIUM 100 MG/1
200 CAPSULE, LIQUID FILLED ORAL DAILY
Status: DISCONTINUED | OUTPATIENT
Start: 2024-01-26 | End: 2024-01-29 | Stop reason: HOSPADM

## 2024-01-25 RX ORDER — BISACODYL 10 MG/1
10 SUPPOSITORY RECTAL DAILY
Status: ACTIVE | OUTPATIENT
Start: 2024-01-28 | End: 2024-01-29

## 2024-01-25 RX ORDER — SODIUM CHLORIDE, SODIUM GLUCONATE, SODIUM ACETATE, POTASSIUM CHLORIDE AND MAGNESIUM CHLORIDE 30; 37; 368; 526; 502 MG/100ML; MG/100ML; MG/100ML; MG/100ML; MG/100ML
INJECTION, SOLUTION INTRAVENOUS CONTINUOUS
Status: DISCONTINUED | OUTPATIENT
Start: 2024-01-25 | End: 2024-01-25

## 2024-01-25 RX ORDER — FAMOTIDINE 20 MG/1
20 TABLET, FILM COATED ORAL 2 TIMES DAILY
Status: DISCONTINUED | OUTPATIENT
Start: 2024-01-25 | End: 2024-01-29 | Stop reason: HOSPADM

## 2024-01-25 RX ORDER — TRAMADOL HYDROCHLORIDE 50 MG/1
50 TABLET ORAL EVERY 4 HOURS PRN
Status: DISCONTINUED | OUTPATIENT
Start: 2024-01-25 | End: 2024-01-29 | Stop reason: HOSPADM

## 2024-01-25 RX ORDER — SODIUM CHLORIDE 9 MG/ML
INJECTION, SOLUTION INTRAVENOUS CONTINUOUS
Status: DISCONTINUED | OUTPATIENT
Start: 2024-01-25 | End: 2024-01-29 | Stop reason: HOSPADM

## 2024-01-25 RX ORDER — ONDANSETRON HYDROCHLORIDE 2 MG/ML
INJECTION, SOLUTION INTRAVENOUS
Status: DISCONTINUED | OUTPATIENT
Start: 2024-01-25 | End: 2024-01-25

## 2024-01-25 RX ORDER — ROPIVACAINE HYDROCHLORIDE 5 MG/ML
INJECTION, SOLUTION EPIDURAL; INFILTRATION; PERINEURAL
Status: DISPENSED
Start: 2024-01-25 | End: 2024-01-25

## 2024-01-25 RX ORDER — KETOROLAC TROMETHAMINE 10 MG/1
10 TABLET, FILM COATED ORAL
Status: DISCONTINUED | OUTPATIENT
Start: 2024-01-25 | End: 2024-01-25

## 2024-01-25 RX ORDER — PROPOFOL 10 MG/ML
VIAL (ML) INTRAVENOUS
Status: DISCONTINUED | OUTPATIENT
Start: 2024-01-25 | End: 2024-01-25

## 2024-01-25 RX ORDER — ROPIVACAINE HYDROCHLORIDE 5 MG/ML
INJECTION, SOLUTION EPIDURAL; INFILTRATION; PERINEURAL
Status: DISCONTINUED | OUTPATIENT
Start: 2024-01-25 | End: 2024-01-25 | Stop reason: HOSPADM

## 2024-01-25 RX ORDER — ONDANSETRON HYDROCHLORIDE 2 MG/ML
4 INJECTION, SOLUTION INTRAVENOUS EVERY 6 HOURS PRN
Status: DISCONTINUED | OUTPATIENT
Start: 2024-01-25 | End: 2024-01-29 | Stop reason: HOSPADM

## 2024-01-25 RX ORDER — METHOCARBAMOL 750 MG/1
750 TABLET, FILM COATED ORAL EVERY 8 HOURS PRN
Status: DISCONTINUED | OUTPATIENT
Start: 2024-01-25 | End: 2024-01-29 | Stop reason: HOSPADM

## 2024-01-25 RX ORDER — MORPHINE SULFATE 10 MG/ML
INJECTION INTRAMUSCULAR; INTRAVENOUS; SUBCUTANEOUS
Status: DISCONTINUED | OUTPATIENT
Start: 2024-01-25 | End: 2024-01-25 | Stop reason: HOSPADM

## 2024-01-25 RX ORDER — LACTULOSE 10 G/15ML
20 SOLUTION ORAL EVERY 6 HOURS PRN
Status: DISCONTINUED | OUTPATIENT
Start: 2024-01-25 | End: 2024-01-29 | Stop reason: HOSPADM

## 2024-01-25 RX ORDER — EPINEPHRINE 1 MG/ML
INJECTION, SOLUTION, CONCENTRATE INTRAVENOUS
Status: DISPENSED
Start: 2024-01-25 | End: 2024-01-25

## 2024-01-25 RX ORDER — MORPHINE SULFATE 10 MG/ML
INJECTION INTRAMUSCULAR; INTRAVENOUS; SUBCUTANEOUS
Status: DISPENSED
Start: 2024-01-25 | End: 2024-01-25

## 2024-01-25 RX ORDER — MIDAZOLAM HYDROCHLORIDE 1 MG/ML
INJECTION INTRAMUSCULAR; INTRAVENOUS
Status: DISCONTINUED | OUTPATIENT
Start: 2024-01-25 | End: 2024-01-25

## 2024-01-25 RX ORDER — HYDROCODONE BITARTRATE AND ACETAMINOPHEN 5; 325 MG/1; MG/1
1 TABLET ORAL EVERY 4 HOURS PRN
Status: DISCONTINUED | OUTPATIENT
Start: 2024-01-25 | End: 2024-01-29 | Stop reason: HOSPADM

## 2024-01-25 RX ORDER — AMOXICILLIN 250 MG
2 CAPSULE ORAL 2 TIMES DAILY
Status: DISCONTINUED | OUTPATIENT
Start: 2024-01-25 | End: 2024-01-29 | Stop reason: HOSPADM

## 2024-01-25 RX ORDER — LIDOCAINE HYDROCHLORIDE 20 MG/ML
INJECTION, SOLUTION EPIDURAL; INFILTRATION; INTRACAUDAL; PERINEURAL
Status: DISCONTINUED | OUTPATIENT
Start: 2024-01-25 | End: 2024-01-25

## 2024-01-25 RX ORDER — SODIUM CHLORIDE 9 MG/ML
INJECTION, SOLUTION INTRAMUSCULAR; INTRAVENOUS; SUBCUTANEOUS
Status: DISCONTINUED | OUTPATIENT
Start: 2024-01-25 | End: 2024-01-25 | Stop reason: HOSPADM

## 2024-01-25 RX ORDER — ACETAMINOPHEN 10 MG/ML
1000 INJECTION, SOLUTION INTRAVENOUS ONCE
Status: COMPLETED | OUTPATIENT
Start: 2024-01-25 | End: 2024-01-25

## 2024-01-25 RX ORDER — BUPIVACAINE HYDROCHLORIDE 7.5 MG/ML
INJECTION, SOLUTION EPIDURAL; RETROBULBAR
Status: COMPLETED | OUTPATIENT
Start: 2024-01-25 | End: 2024-01-25

## 2024-01-25 RX ORDER — PROPOFOL 10 MG/ML
VIAL (ML) INTRAVENOUS CONTINUOUS PRN
Status: DISCONTINUED | OUTPATIENT
Start: 2024-01-25 | End: 2024-01-25

## 2024-01-25 RX ORDER — SCOLOPAMINE TRANSDERMAL SYSTEM 1 MG/1
1 PATCH, EXTENDED RELEASE TRANSDERMAL ONCE AS NEEDED
Status: DISCONTINUED | OUTPATIENT
Start: 2024-01-25 | End: 2024-01-25

## 2024-01-25 RX ORDER — GLYCOPYRROLATE 0.2 MG/ML
INJECTION INTRAMUSCULAR; INTRAVENOUS
Status: DISCONTINUED | OUTPATIENT
Start: 2024-01-25 | End: 2024-01-25

## 2024-01-25 RX ORDER — KETOROLAC TROMETHAMINE 30 MG/ML
INJECTION, SOLUTION INTRAMUSCULAR; INTRAVENOUS
Status: DISPENSED
Start: 2024-01-25 | End: 2024-01-25

## 2024-01-25 RX ORDER — POLYETHYLENE GLYCOL 3350 17 G/17G
17 POWDER, FOR SOLUTION ORAL NIGHTLY
Status: DISCONTINUED | OUTPATIENT
Start: 2024-01-25 | End: 2024-01-29 | Stop reason: HOSPADM

## 2024-01-25 RX ORDER — ONDANSETRON HYDROCHLORIDE 2 MG/ML
4 INJECTION, SOLUTION INTRAVENOUS ONCE AS NEEDED
Status: DISCONTINUED | OUTPATIENT
Start: 2024-01-25 | End: 2024-01-25

## 2024-01-25 RX ORDER — KETOROLAC TROMETHAMINE 30 MG/ML
15 INJECTION, SOLUTION INTRAMUSCULAR; INTRAVENOUS EVERY 6 HOURS
Status: COMPLETED | OUTPATIENT
Start: 2024-01-25 | End: 2024-01-26

## 2024-01-25 RX ORDER — TRANEXAMIC ACID 100 MG/ML
INJECTION, SOLUTION INTRAVENOUS
Status: DISCONTINUED | OUTPATIENT
Start: 2024-01-25 | End: 2024-01-25

## 2024-01-25 RX ORDER — ACETAMINOPHEN 500 MG
500 TABLET ORAL EVERY 4 HOURS
Status: DISCONTINUED | OUTPATIENT
Start: 2024-01-25 | End: 2024-01-27

## 2024-01-25 RX ADMIN — BUPIVACAINE HYDROCHLORIDE 1.8 ML: 7.5 INJECTION, SOLUTION EPIDURAL; RETROBULBAR at 10:01

## 2024-01-25 RX ADMIN — METOCLOPRAMIDE 10 MG: 5 INJECTION, SOLUTION INTRAMUSCULAR; INTRAVENOUS at 05:01

## 2024-01-25 RX ADMIN — MIDAZOLAM 1 MG: 1 INJECTION INTRAMUSCULAR; INTRAVENOUS at 10:01

## 2024-01-25 RX ADMIN — GLYCOPYRROLATE 0.1 MG: 0.2 INJECTION INTRAMUSCULAR; INTRAVENOUS at 11:01

## 2024-01-25 RX ADMIN — SENNOSIDES AND DOCUSATE SODIUM 2 TABLET: 50; 8.6 TABLET ORAL at 08:01

## 2024-01-25 RX ADMIN — METOCLOPRAMIDE 10 MG: 5 INJECTION, SOLUTION INTRAMUSCULAR; INTRAVENOUS at 11:01

## 2024-01-25 RX ADMIN — SODIUM CHLORIDE, SODIUM GLUCONATE, SODIUM ACETATE, POTASSIUM CHLORIDE AND MAGNESIUM CHLORIDE: 526; 502; 368; 37; 30 INJECTION, SOLUTION INTRAVENOUS at 10:01

## 2024-01-25 RX ADMIN — DEXAMETHASONE SODIUM PHOSPHATE 4 MG: 4 INJECTION, SOLUTION INTRA-ARTICULAR; INTRALESIONAL; INTRAMUSCULAR; INTRAVENOUS; SOFT TISSUE at 10:01

## 2024-01-25 RX ADMIN — MIDAZOLAM 0.5 MG: 1 INJECTION INTRAMUSCULAR; INTRAVENOUS at 10:01

## 2024-01-25 RX ADMIN — KETAMINE HYDROCHLORIDE 10 MG: 100 INJECTION, SOLUTION INTRAMUSCULAR; INTRAVENOUS at 11:01

## 2024-01-25 RX ADMIN — PROPOFOL 20 MG: 10 INJECTION, EMULSION INTRAVENOUS at 10:01

## 2024-01-25 RX ADMIN — CEFAZOLIN 2 G: 330 INJECTION, POWDER, FOR SOLUTION INTRAMUSCULAR; INTRAVENOUS at 10:01

## 2024-01-25 RX ADMIN — CEFAZOLIN 2 G: 2 INJECTION, POWDER, FOR SOLUTION INTRAMUSCULAR; INTRAVENOUS at 04:01

## 2024-01-25 RX ADMIN — PROPOFOL 30 MG: 10 INJECTION, EMULSION INTRAVENOUS at 10:01

## 2024-01-25 RX ADMIN — LIDOCAINE HYDROCHLORIDE 20 MG: 10 INJECTION, SOLUTION EPIDURAL; INFILTRATION; INTRACAUDAL; PERINEURAL at 10:01

## 2024-01-25 RX ADMIN — TRANEXAMIC ACID 1000 MG: 100 INJECTION, SOLUTION INTRAVENOUS at 11:01

## 2024-01-25 RX ADMIN — CEFAZOLIN 2 G: 2 INJECTION, POWDER, FOR SOLUTION INTRAMUSCULAR; INTRAVENOUS at 11:01

## 2024-01-25 RX ADMIN — ACETAMINOPHEN 500 MG: 500 TABLET ORAL at 11:01

## 2024-01-25 RX ADMIN — GLYCOPYRROLATE 0.1 MG: 0.2 INJECTION INTRAMUSCULAR; INTRAVENOUS at 10:01

## 2024-01-25 RX ADMIN — SODIUM CHLORIDE: 9 INJECTION, SOLUTION INTRAVENOUS at 01:01

## 2024-01-25 RX ADMIN — KETOROLAC TROMETHAMINE 15 MG: 30 INJECTION, SOLUTION INTRAMUSCULAR at 09:01

## 2024-01-25 RX ADMIN — POLYETHYLENE GLYCOL 3350 17 G: 17 POWDER, FOR SOLUTION ORAL at 08:01

## 2024-01-25 RX ADMIN — ACETAMINOPHEN 1000 MG: 10 INJECTION, SOLUTION INTRAVENOUS at 11:01

## 2024-01-25 RX ADMIN — SODIUM CHLORIDE, SODIUM GLUCONATE, SODIUM ACETATE, POTASSIUM CHLORIDE AND MAGNESIUM CHLORIDE: 526; 502; 368; 37; 30 INJECTION, SOLUTION INTRAVENOUS at 12:01

## 2024-01-25 RX ADMIN — CITALOPRAM HYDROBROMIDE 40 MG: 20 TABLET ORAL at 08:01

## 2024-01-25 RX ADMIN — PROPOFOL 50 MCG/KG/MIN: 10 INJECTION, EMULSION INTRAVENOUS at 10:01

## 2024-01-25 RX ADMIN — TRANEXAMIC ACID 1000 MG: 100 INJECTION, SOLUTION INTRAVENOUS at 10:01

## 2024-01-25 RX ADMIN — CEFAZOLIN 2 G: 2 INJECTION, POWDER, FOR SOLUTION INTRAMUSCULAR; INTRAVENOUS at 05:01

## 2024-01-25 RX ADMIN — FAMOTIDINE 20 MG: 20 TABLET ORAL at 08:01

## 2024-01-25 RX ADMIN — PHENYLEPHRINE HYDROCHLORIDE 200 MCG: 10 INJECTION INTRAVENOUS at 10:01

## 2024-01-25 RX ADMIN — TRAZODONE HYDROCHLORIDE 100 MG: 50 TABLET ORAL at 08:01

## 2024-01-25 RX ADMIN — PHENYLEPHRINE HYDROCHLORIDE 0.8 MCG/KG/MIN: 10 INJECTION INTRAVENOUS at 10:01

## 2024-01-25 RX ADMIN — KETOROLAC TROMETHAMINE 15 MG: 30 INJECTION INTRAMUSCULAR; INTRAVENOUS at 05:01

## 2024-01-25 RX ADMIN — KETAMINE HYDROCHLORIDE 20 MG: 100 INJECTION, SOLUTION INTRAMUSCULAR; INTRAVENOUS at 10:01

## 2024-01-25 RX ADMIN — TRAMADOL HYDROCHLORIDE 50 MG: 50 TABLET, COATED ORAL at 08:01

## 2024-01-25 RX ADMIN — ACETAMINOPHEN 1000 MG: 1000 INJECTION INTRAVENOUS at 08:01

## 2024-01-25 RX ADMIN — ONDANSETRON HYDROCHLORIDE 4 MG: 2 SOLUTION INTRAMUSCULAR; INTRAVENOUS at 10:01

## 2024-01-25 RX ADMIN — LIDOCAINE HYDROCHLORIDE 50 MG: 20 INJECTION, SOLUTION EPIDURAL; INFILTRATION; INTRACAUDAL; PERINEURAL at 10:01

## 2024-01-25 RX ADMIN — PROPOFOL 30 MG: 10 INJECTION, EMULSION INTRAVENOUS at 11:01

## 2024-01-25 NOTE — NURSING
Nurses Note -- 4 Eyes      1/24/2024   11:12 PM      Skin assessed during: Admit      [] No Altered Skin Integrity Present    []Prevention Measures Documented      [x] Yes- Altered Skin Integrity Present or Discovered   [] LDA Added if Not in Epic (Describe Wound)   [] New Altered Skin Integrity was Present on Admit and Documented in LDA   [] Wound Image Taken    Wound Care Consulted? No    Attending Nurse:  Nathaly Plunkett RN    Second RN/Staff Member:   David COATS

## 2024-01-25 NOTE — H&P
Past Medical History:   Diagnosis Date    Anxiety disorder, unspecified     Depression     Femur fracture, left     Fracture, foot, left     Hypercholesteremia     PONV (postoperative nausea and vomiting)        Past Surgical History:   Procedure Laterality Date    APPENDECTOMY      AUGMENTATION OF BREAST Bilateral     CARDIAC CATHETERIZATION      WNL     SECTION      OOPHORECTOMY Right     age 15    right meniscus repair         Current Facility-Administered Medications   Medication    0.9%  NaCl infusion    acetaminophen tablet 650 mg    acetaminophen tablet 650 mg    citalopram tablet 40 mg    enoxaparin injection 40 mg    ketorolac injection 15 mg    melatonin tablet 6 mg    methocarbamoL tablet 500 mg    ondansetron disintegrating tablet 8 mg    ondansetron injection 4 mg    oxyCODONE immediate release tablet 10 mg    oxyCODONE immediate release tablet 5 mg    traZODone tablet 100 mg       Review of patient's allergies indicates:  No Known Allergies    History reviewed. No pertinent family history.    Social History     Socioeconomic History    Marital status:    Tobacco Use    Smoking status: Every Day     Current packs/day: 1.00     Types: Cigarettes    Smokeless tobacco: Never   Substance and Sexual Activity    Alcohol use: Not Currently       Chief Complaint:   Chief Complaint   Patient presents with    Fall     Pt fell 8ft off ladder pta. Pt first hit left ankle then left hip. Pt reports decreased sensation to left foot. Pt denies back pain or tenderness on palpation. Swelling and bruising noted to left foot/ankle. 2+ pedal pulse b/l. Pt denies neck pain or tenderness on palpation.       History of present illness:  Patient is a 68-year-old female that fell a couple of days ago off of a ladder.  She was 8 ft up on the ladder that was being held by her .  She was changing light bulbs when she lost her balance and fell landing on her left foot and ankle and left hip.  She sustained a  left calcaneus fracture that was operated on yesterday by Dr. Selby.  [facility] for left total hip arthroplasty secondary to displaced femoral neck fracture.  Dr. Chen asked that I see the patient today for surgical intervention.  She has no other complaints.  She is comfortable despite the recent operation on her heel.  She is just sore in her heel and pain is well controlled.  She is sore in her hip.  She has no other complaints.  No loss of consciousness.  No back pain.    Past medical history is depression and anxiety because of her 's multiple cancer diagnoses and surgeries which she has had to take care of him in the past.    Past surgical history: None    No known drug allergies    Home medications:  Celexa, trazodone,      Review of Systems:    Constitution: Negative for chills, fever, and sweats.  Negative for unexplained weight loss.    HENT:  Negative for headaches and blurry vision.    Cardiovascular:Negative for chest pain or irregular heart beat. Negative for hypertension.    Respiratory:  Negative for cough and shortness of breath.    Gastrointestinal: Negative for abdominal pain, heartburn, melena, nausea, and vomitting.    Genitourinary:  Negative bladder incontinence and dysuria.    Musculoskeletal:  See HPI    Neurological: Negative for numbness.    Psychiatric/Behavioral: Negative for depression.  The patient is not nervous/anxious.      Endocrine: Negative for polyuria    Hematologic/Lymphatic: Negative for bleeding problem.  Does not bruise/bleed easily.    Skin: Negative for poor would healing and rash      Physical Examination:    Vital Signs:    Vitals:    01/25/24 0541   BP: 108/71   Pulse: 86   Resp:    Temp: 98 °F (36.7 °C)       Body mass index is 22.05 kg/m².    This a well-developed, well nourished patient in no acute distress.  They are alert and oriented and cooperative to examination.  Pt. walks  without an antalgic gait.      Physical exam  General exam:  Well groomed, well nourished, no acute distress  Alert and oriented x3  HEENT:  Pupils are equal, round, and reactive to light, normocephalic, atraumatic  Cardiovascular:  S1 and S2 heard, no murmurs  Pulmonary:  Lungs clear to auscultation bilaterally  Gastrointestinal:  Positive bowel sounds, soft, nontender, nondistended      Left hip exam:   Mild left proximal thigh swelling   No compartment syndrome   No skin breakdown   Mild bruising   Tenderness to palpation and tender in the left groin with any motion due to the hip fracture   2+ dorsal pedal pulse   Intact sensory and motor function with strong dorsiflexion and plantar flexion of the great and lesser toes  No compartment syndrome distally   Dressing in place from yesterday's ORIF of the calcaneus    X-rays:  Radiographs of the pelvis and left hip show a displaced left hip femoral neck fracture that is mid cervical     Assessment::  Displaced mid cervical left hip femoral neck fracture    Plan:  Robotic assisted left total hip arthroplasty    Risks, benefits, alternatives, and complications were explained to the patient and/or patient representative. They understand, agree, and want to proceed with the operation/ procedure.      This note was created using [employer] voice recognition software that occasionally misinterpreted phrases or words.    Consult note is delivered via Epic messaging service.

## 2024-01-25 NOTE — NURSING
"MD Richards in room discussing sx procedure this am, time given for questions and concerns, site marked. Nurse asked patient if she understood everything MD explained, pt replied with "yes" consents signed.   "

## 2024-01-25 NOTE — OP NOTE
DATE OF PROCEDURE: 01/25/2024    PREOPERATIVE DIAGNOSIS:  Displaced femoral neck fracture,left hip.   POSTOPERATIVE DIAGNOSIS:  Displaced femoral neck fracture, left hip.   PROCEDURES PERFORMED: left total hip arthroplasty with robotic navigation.   SURGEON: Mikael Richards M.D.   ASSISTANT: First assistant:Fan Walls, certified physician assistant, who was necessary and essential for all aspects of the operation, including but no limited to patient positioning, surgical exposure, bony preparation, implantation, wound closure, and dressing placement.    ANESTHESIA: spinal    SPECIMEN: Femoral Head  FINDINGS: Arthritis  BLOOD LOSS:  125 mL  COMPLICATIONS: None.   COUNTS: Correct.   DISPOSITION: Recovery Room, stable.   IMPLANTS: Al Trident II size 50 mm acetabular component with a 36 mm x neutral liner, Al Insignia size 6 ,    hip stem with a 36 mm standard neck length Biolox femoral head.   INDICATIONS FOR PROCEDURE: Che Kendall is a 68 y.o. year old female  who is having   symptoms of left hip pain. Physical examination and imaging studies were   consistent with left hip displaced femoral neck fracture  Treatment options were explained to the patient. She   decided to proceed with left total hip arthroplasty with robotic assistance.   She was aware of reasonable treatment options as well as risks and benefits, and   elected to undergo the procedure.   PROCEDURE IN DETAIL: After appropriate consent was obtained, the patient was   brought in the Operating Room, anesthesia was administered.  Prior to this, the patient received antibiotic prophylaxis.   She was then positioned in the lateral decubitus position with the left hip   pointed upward. Axillary roll was placed. Bony prominences were well padded.   Pegboard positioning system was utilized. The left hip and lower extremity   were then prepped and draped in the usual sterile fashion. A time out was called.  There were no concerns expressed  by members of the team, and the correct side was confirmed. Three small incisions were made over the iliac crest, followed by pin placement. The pelvic array was assembled and registered.  Anterolateral   approach to the hip was made. Incision was made over the greater trochanter and was taken down through the skin and tensor fascia.   The tensor fascia was split in line with the skin incision. Skin bleeders were coagulated with cautery. The sciatic nerve   was palpated and protected, it was out of harm's way and the Charnley retractor was placed. Femoral checkpoint was placed and registered.The anterolateral approach was performed by incising the anterior one-fourth of the gluteus medius while leaving a tendinous cuff for later repair. I then incised the gluteus minimus superiorly in line with the neck of the femur. A posterior capsulotomy was performed along the neck of the femur. The hip was externally rotated until the fibers of the vastus intermedius were identified. Retractors were repositioned to expose the anterior capsule and an anterior capsulotomy was performed. The hip was then externally rotated, dislocated, and a femoral neck osteotomy was made at the appropriate level.  Fractured femoral neck was noted an osteotomy was made proximal to the mid cervical fracture.  I then removed multiple femoral head fragments and the femoral head.  The acetabulum was intact with no fracture.  Acetabular retractors were placed and the labrum was excised. The pelvic checkpoint was then registered followed by fine point registration of the acetabulum. Reaming was then performed to the planned depth and position robotically.  Utilizing the robotic interactive arm, the cup was impacted in 41° of abduction and 22° of anteversion. There was an excellent pressfit of the acetabular component. The liner was then pressfit into the cup.  The hip was then repositioned to expose the proximal femur. The box osteotome was used to  lateralize the starting point on the femur, followed by the awl to open up the canal.  Sequential broaching was performed up to a  size 6 broach. Trialing was performed with a high offset neck angle stem in 10 -degrees of anteversion.  The hip was reduced and there was excellent stability and restoration of leg lengths and offset, with no dislocation, subluxation, or impingement. At this point, the hip was dislocated with the aid of a neck hook.   The femoral trial component was removed. The actual femoral component was   firmly seated. The neck and calcar was inspected.  There was no crack or fracture. I remeasured at this point and, once again, we needed the standard neck length 36 mm diameter Biolox femoral  head. The head was then impacted onto a clean, dry trunion. The   acetabulum was inspected. There was no loose body, foreign body, or soft tissue   interposition. The hip was then reduced, brought through range of motion, and   stable as previously described. At this point, the hip was soaked in a dilute betadine solution for three minutes, then irrigated. The soft tissues and capsule were then injected for postoperative pain control. Three small drill holes were made in the proximal femur, and through these drill holes the gluteus minimus was repaired the its insertion. The anterior one-fourth of the gluteus medius was repaired to its tendinous cuff. A looped PDS suture was used to close the iliotibial band, followed by reapproximation of the skin with 2-0 vicryl suture. A running 4-0 monocryl suture was used for skin closure along with steri-strips. Sterile dressings were applied along with an abduction pillow, and the patient was taken to recovery room in stable condition.

## 2024-01-25 NOTE — PT/OT/SLP EVAL
Physical Therapy Evaluation    Patient Name:  Che Kendall   MRN:  [id number]    Recommendations:     Discharge Recommendations: Low Intensity Therapy   Discharge Equipment Recommendations: walker, rolling   Barriers to discharge: None    Assessment:     Che Kendall is a 68 y.o. female admitted with a medical diagnosis of Closed fracture of neck of left femur.  She presents with the following impairments/functional limitations: weakness, impaired endurance, impaired functional mobility, decreased lower extremity function, pain, decreased ROM, edema, orthopedic precautions.    Rehab Prognosis: Fair; patient would benefit from acute skilled PT services to address these deficits and reach maximum level of function.    Recent Surgery: Procedure(s) (LRB):  ROBOTIC ARTHROPLASTY,HIP (Left) Day of Surgery    Plan:     During this hospitalization, patient to be seen BID to address the identified rehab impairments via gait training, therapeutic activities, therapeutic exercises and progress toward the following goals:    Plan of Care Expires:  01/31/24    Subjective     Chief Complaint: L hip pain, L ankle pain  Patient/Family Comments/goals:   Pain/Comfort:  Location - Side 1: Left  Location 1: hip  Pain Addressed 1: Pre-medicate for activity, Reposition, Distraction, Cessation of Activity  Location - Side 2: Left  Location 2: ankle  Pain Addressed 2: Pre-medicate for activity, Reposition, Distraction, Cessation of Activity    Patients cultural, spiritual, Church conflicts given the current situation:      Living Environment:  Pt lives in single story home with , no steps to enter.  Prior to admission, patients level of function was ind.  Equipment used at home: none.  DME owned (not currently used): none.  Upon discharge, patient will have assistance from .    Objective:     Communicated with nurse prior to session.  Patient found supine with peripheral IV  upon PT entry to room.    General  Precautions: Standard, fall  Orthopedic Precautions:LLE toe touch weight bearing, LLE posterior precautions   Braces:    Respiratory Status: Room air    Exams:  RLE ROM: WFL  RLE Strength: WFL  LLE ROM: NT dt sx side  LLE Strength: NT dt sx side    Functional Mobility:  Bed Mobility:     Supine to Sit: stand by assistance  Transfers:     Sit to Stand:  contact guard assistance with rolling walker  Gait: Pt ambulated 12 ft. W rw and CGA, using hop to gait pattern on RLE with boot donned on LLE    Treatment & Education:  Pt edu on importance of frequent mobility, hip precautions, and NWB status    Patient left up in chair with all lines intact, call button in reach, and nurse notified.    GOALS:   Multidisciplinary Problems       Physical Therapy Goals          Problem: Physical Therapy    Goal Priority Disciplines Outcome Goal Variances Interventions   Physical Therapy Goal     PT, PT/OT Ongoing, Progressing     Description: Pt will improve functional independence by performing:    Bed mobility: SBA  Sit to stand: SBA with rolling walker  Bed to chair t/f: SBA with Stand Step  with rolling walker  Ambulation x 200'  with SBA with rolling walker  1 Step (Curb): Min A  with rolling walker  3 Steps: Min A  with B HR  Independent with total hip HEP                        History:     Past Medical History:   Diagnosis Date    Anxiety disorder, unspecified     Depression     Femur fracture, left     Fracture, foot, left     Hypercholesteremia     PONV (postoperative nausea and vomiting)        Past Surgical History:   Procedure Laterality Date    APPENDECTOMY      AUGMENTATION OF BREAST Bilateral     CARDIAC CATHETERIZATION      WNL     SECTION      OOPHORECTOMY Right     age 15    OPEN REDUCTION AND INTERNAL FIXATION (ORIF) OF FRACTURE OF CALCANEUS Left 2024    Procedure: ORIF, FRACTURE, CALCANEUS;  Surgeon: Constantin Selby MD;  Location: [facility];  Service: Orthopedics;  Laterality: Left;  Lateral, vascular  bed, moon foam, tourniquet  4th case  synthes large [facility] and [facility] calc plates    right meniscus repair         Time Tracking:     PT Received On:    PT Start Time: 1651     PT Stop Time: 1715  PT Total Time (min): 24 min     Billable Minutes: Evaluation 24 01/25/2024

## 2024-01-25 NOTE — TRANSFER OF CARE
"Anesthesia Transfer of Care Note    Patient: Che Kendall    Procedure(s) Performed: Procedure(s) (LRB):  ROBOTIC ARTHROPLASTY,HIP (Left)    Patient location: PACU    Anesthesia Type: general and spinal    Transport from OR: Transported from OR on room air with adequate spontaneous ventilation    Post pain: adequate analgesia    Post assessment: no apparent anesthetic complications    Post vital signs: stable    Level of consciousness: responds to stimulation    Nausea/Vomiting: no nausea/vomiting    Complications: none    Transfer of care protocol was followed      Last vitals: Visit Vitals  /73   Pulse 91   Temp 36.4 °C (97.5 °F) (Temporal)   Resp (!) 24   Ht 5' 8" (1.727 m)   Wt 65.8 kg (145 lb)   SpO2 100%   BMI 22.05 kg/m²     "

## 2024-01-25 NOTE — INTERVAL H&P NOTE
The patient has been examined and the H&P has been reviewed:    I concur with the findings and no changes have occurred since H&P was written.    Surgery risks, benefits and alternative options discussed and understood by patient/family.          Active Hospital Problems    Diagnosis  POA    *Closed fracture of neck of left femur [S72.002A]  Yes    Closed displaced fracture of left calcaneus [S92.002A]  Yes      Resolved Hospital Problems   No resolved problems to display.

## 2024-01-25 NOTE — ANESTHESIA PREPROCEDURE EVALUATION
01/25/2024  Che Kendall is a 68 y.o., female , who presents for the following:    Procedure: ROBOTIC ARTHROPLASTY,HIP (Left: Hip) - Kostas Nichole   Anesthesia type: Spinal   Diagnosis: Closed fracture of neck of left femur, initial encounter [S72.002A]   Pre-op diagnosis: Closed fracture of neck of left femur, initial encounter [S72.002A]   Location: [facility] OR [address] [place] [address] OR   Surgeons: Isaias Richards MD     HPI:                                                                                                                   01/24/2024  Che Kendall is a 68 y.o., female who fell from a ladder and sustained Closed displaced fracture of her Left Calcaneus and .  Left Femoral Neck fracture.        PMHx:  PONV (postoperative nausea and vomiting)     Other Medical History   Hypercholesteremia Anxiety disorder, unspecified   Depression Fracture, foot, left   Femur fracture, left        Lab:        Pre-op Assessment    I have reviewed the Patient Summary Reports.     I have reviewed the Nursing Notes. I have reviewed the NPO Status.   I have reviewed the Medications.     Review of Systems  Anesthesia Hx:  No problems with previous Anesthesia             Denies Family Hx of Anesthesia complications.    Denies Personal Hx of Anesthesia complications.                    Social:  Smoker       Cardiovascular:                hyperlipidemia    > 4 METS activity                         Pulmonary:  Pulmonary Normal                       Endocrine:  Endocrine Normal            Psych:   anxiety depression                Physical Exam  General: Alert and Oriented    Airway:  Mallampati: II   Mouth Opening: Normal  TM Distance: Normal  Tongue: Normal  Neck ROM: Normal ROM    Dental:  Intact    Chest/Lungs:  Normal Respiratory Rate    Heart:  Rate: Normal  Rhythm: Regular Rhythm        Anesthesia Plan  Type of Anesthesia,  risks & benefits discussed:    Anesthesia Type: Gen Natural Airway, Regional, Spinal  Intra-op Monitoring Plan: Standard ASA Monitors  Post Op Pain Control Plan: IV/PO Opioids PRN  Induction:  IV  Airway Plan: Direct  Informed Consent: Informed consent signed with the Patient and all parties understand the risks and agree with anesthesia plan.  All questions answered. Patient consented to blood products? Yes  ASA Score: 2  Day of Surgery Review of History & Physical: H&P Update referred to the surgeon/provider.  Anesthesia Plan Notes: Nasal cannula vs facemask supplemental oxygenation   For patients with YUMIKO/obesity, may consider SuperNoval Nasal CPAP  Spinal Anesthesia w/ IV Propofol sedation      Ready For Surgery From Anesthesia Perspective.     .

## 2024-01-25 NOTE — ANESTHESIA PROCEDURE NOTES
Spinal    Diagnosis: Left Femoral Neck Fx  Patient location during procedure: OR  Start time: 1/25/2024 10:35 AM  Timeout: 1/25/2024 10:32 AM  End time: 1/25/2024 10:43 AM    Staffing  Authorizing Provider: Dwayne Welch MD  Performing Provider: Dwayne Welch MD    Staffing  Performed by: Dwayne Welch MD  Authorized by: Dwayne Welch MD    Preanesthetic Checklist  Completed: patient identified, IV checked, site marked, risks and benefits discussed, surgical consent, monitors and equipment checked, pre-op evaluation and timeout performed  Spinal Block  Patient position: left lateral decubitus  Prep: ChloraPrep  Patient monitoring: heart rate, continuous pulse ox, frequent blood pressure checks and cardiac monitor  Approach: midline  Location: L3-4 (Successful on 3rd attempt @ L 3-4)  Injection technique: single shot  CSF Fluid: clear free-flowing CSF  Needle  Needle type: pencil-tip   Needle gauge: 25 G  Needle length: 3.5 in  Additional Documentation: incremental injection, negative aspiration for heme and no paresthesia on injection  Needle localization: anatomical landmarks  Assessment  Sensory level: T4   Dermatomal levels determined by alcohol wipe and pinch or prick  Ease of block: moderate  Patient's tolerance of the procedure: comfortable throughout block and no complaints  Medications:    Medications: bupivacaine (pf) (MARCAINE) injection 0.75% - Intraspinal   1.8 mL - 1/25/2024 10:43:00 AM  lidocaine (PF) injection 1% - Subcutaneous, Back   20 mg - 1/25/2024 10:35:00 AM   20 mg - 1/25/2024 10:40:00 AM

## 2024-01-25 NOTE — PLAN OF CARE
Patient transferred to [facility].  Dr. Selby with Orthopedics as discussed the plan of care with Dr. Chen who has assumed care of the patient for their need for robotic hip surgery.  We are available as needed.  No indication to follow up with us in our clinic.

## 2024-01-25 NOTE — ANESTHESIA POSTPROCEDURE EVALUATION
Anesthesia Post Evaluation    Patient: Che Kendall    Procedure(s) Performed: Procedure(s) (LRB):  ROBOTIC ARTHROPLASTY,HIP (Left)    Final Anesthesia Type: spinal      Patient location during evaluation: PACU  Patient participation: Yes- Able to Participate  Level of consciousness: oriented and sedated  Post-procedure vital signs: reviewed and stable  Pain management: adequate  Airway patency: patent    PONV status at discharge: No PONV  Anesthetic complications: no      Cardiovascular status: stable and hemodynamically stable  Respiratory status: spontaneous ventilation and unassisted  Hydration status: euvolemic  Follow-up not needed.  Comments: [facility]          NEURO: Neuraxial block resolving    Vitals Value Taken Time   /79 01/25/24 1306   Temp 36.4 °C (97.5 °F) 01/25/24 1223   Pulse 90 01/25/24 1306   Resp 12 01/25/24 1306   SpO2 100 % 01/25/24 1306   Vitals shown include unvalidated device data.      Event Time   Out of Recovery 01/25/2024 13:08:31         Pain/Primo Score: Pain Rating Prior to Med Admin: 3 (1/25/2024  5:29 AM)  Pain Rating Post Med Admin: 2 (1/25/2024  5:41 AM)  Primo Score: 8 (1/25/2024 12:53 PM)

## 2024-01-26 LAB
ANION GAP SERPL CALC-SCNC: 6 MEQ/L
BUN SERPL-MCNC: 11.3 MG/DL (ref 9.8–20.1)
CALCIUM SERPL-MCNC: 8.3 MG/DL (ref 8.4–10.2)
CHLORIDE SERPL-SCNC: 113 MMOL/L (ref 98–107)
CO2 SERPL-SCNC: 22 MMOL/L (ref 23–31)
CREAT SERPL-MCNC: 0.69 MG/DL (ref 0.55–1.02)
CREAT/UREA NIT SERPL: 16
ERYTHROCYTE [DISTWIDTH] IN BLOOD BY AUTOMATED COUNT: 13.1 % (ref 11.5–17)
GFR SERPLBLD CREATININE-BSD FMLA CKD-EPI: >60 MLS/MIN/1.73/M2
GLUCOSE SERPL-MCNC: 102 MG/DL (ref 82–115)
HCT VFR BLD AUTO: 27.7 % (ref 37–47)
HGB BLD-MCNC: 9.3 G/DL (ref 12–16)
MCH RBC QN AUTO: 33.5 PG (ref 27–31)
MCHC RBC AUTO-ENTMCNC: 33.6 G/DL (ref 33–36)
MCV RBC AUTO: 99.6 FL (ref 80–94)
NRBC BLD AUTO-RTO: 0 %
PLATELET # BLD AUTO: 171 X10(3)/MCL (ref 130–400)
PMV BLD AUTO: 10.3 FL (ref 7.4–10.4)
POTASSIUM SERPL-SCNC: 4.2 MMOL/L (ref 3.5–5.1)
RBC # BLD AUTO: 2.78 X10(6)/MCL (ref 4.2–5.4)
SODIUM SERPL-SCNC: 141 MMOL/L (ref 136–145)
WBC # SPEC AUTO: 12.18 X10(3)/MCL (ref 4.5–11.5)

## 2024-01-26 PROCEDURE — 63600175 PHARM REV CODE 636 W HCPCS: Performed by: SPECIALIST

## 2024-01-26 PROCEDURE — 97116 GAIT TRAINING THERAPY: CPT | Mod: CQ

## 2024-01-26 PROCEDURE — 25000003 PHARM REV CODE 250: Performed by: SPECIALIST

## 2024-01-26 PROCEDURE — 97166 OT EVAL MOD COMPLEX 45 MIN: CPT

## 2024-01-26 PROCEDURE — 85027 COMPLETE CBC AUTOMATED: CPT | Performed by: SPECIALIST

## 2024-01-26 PROCEDURE — 80048 BASIC METABOLIC PNL TOTAL CA: CPT | Performed by: SPECIALIST

## 2024-01-26 PROCEDURE — 27000221 HC OXYGEN, UP TO 24 HOURS

## 2024-01-26 PROCEDURE — 97110 THERAPEUTIC EXERCISES: CPT | Mod: CQ

## 2024-01-26 PROCEDURE — 94761 N-INVAS EAR/PLS OXIMETRY MLT: CPT

## 2024-01-26 PROCEDURE — 11000001 HC ACUTE MED/SURG PRIVATE ROOM

## 2024-01-26 PROCEDURE — 99900035 HC TECH TIME PER 15 MIN (STAT)

## 2024-01-26 RX ADMIN — TRAMADOL HYDROCHLORIDE 50 MG: 50 TABLET, COATED ORAL at 02:01

## 2024-01-26 RX ADMIN — CEFAZOLIN 2 G: 2 INJECTION, POWDER, FOR SOLUTION INTRAMUSCULAR; INTRAVENOUS at 05:01

## 2024-01-26 RX ADMIN — KETOROLAC TROMETHAMINE 15 MG: 30 INJECTION, SOLUTION INTRAMUSCULAR at 05:01

## 2024-01-26 RX ADMIN — POLYETHYLENE GLYCOL 3350 17 G: 17 POWDER, FOR SOLUTION ORAL at 08:01

## 2024-01-26 RX ADMIN — ACETAMINOPHEN 500 MG: 500 TABLET ORAL at 05:01

## 2024-01-26 RX ADMIN — ACETAMINOPHEN 500 MG: 500 TABLET ORAL at 02:01

## 2024-01-26 RX ADMIN — TRAMADOL HYDROCHLORIDE 50 MG: 50 TABLET, COATED ORAL at 06:01

## 2024-01-26 RX ADMIN — DOCUSATE SODIUM 200 MG: 100 CAPSULE, LIQUID FILLED ORAL at 05:01

## 2024-01-26 RX ADMIN — CITALOPRAM HYDROBROMIDE 40 MG: 20 TABLET ORAL at 08:01

## 2024-01-26 RX ADMIN — FAMOTIDINE 20 MG: 20 TABLET ORAL at 08:01

## 2024-01-26 RX ADMIN — KETOROLAC TROMETHAMINE 15 MG: 30 INJECTION, SOLUTION INTRAMUSCULAR at 12:01

## 2024-01-26 RX ADMIN — ENOXAPARIN SODIUM 40 MG: 100 INJECTION SUBCUTANEOUS at 08:01

## 2024-01-26 RX ADMIN — TRAZODONE HYDROCHLORIDE 100 MG: 50 TABLET ORAL at 08:01

## 2024-01-26 RX ADMIN — METOCLOPRAMIDE 10 MG: 5 INJECTION, SOLUTION INTRAMUSCULAR; INTRAVENOUS at 09:01

## 2024-01-26 RX ADMIN — SENNOSIDES AND DOCUSATE SODIUM 2 TABLET: 50; 8.6 TABLET ORAL at 08:01

## 2024-01-26 RX ADMIN — METOCLOPRAMIDE 10 MG: 5 INJECTION, SOLUTION INTRAMUSCULAR; INTRAVENOUS at 05:01

## 2024-01-26 RX ADMIN — TRAMADOL HYDROCHLORIDE 50 MG: 50 TABLET, COATED ORAL at 08:01

## 2024-01-26 RX ADMIN — ACETAMINOPHEN 500 MG: 500 TABLET ORAL at 09:01

## 2024-01-26 RX ADMIN — TRAMADOL HYDROCHLORIDE 50 MG: 50 TABLET, COATED ORAL at 03:01

## 2024-01-26 RX ADMIN — METOCLOPRAMIDE 10 MG: 5 INJECTION, SOLUTION INTRAMUSCULAR; INTRAVENOUS at 10:01

## 2024-01-26 NOTE — PLAN OF CARE
01/26/24 1132   Discharge Assessment   Assessment Type Discharge Planning Assessment   Source of Information patient;family   Communicated GRETA with patient/caregiver Yes   Reason For Admission s/p calcaneus & hip repair   People in Home spouse   Do you expect to return to your current living situation? Yes   Do you have help at home or someone to help you manage your care at home? Yes   Who are your caregiver(s) and their phone number(s)?    Prior to hospitilization cognitive status: Alert/Oriented   Current cognitive status: Alert/Oriented   Walking or Climbing Stairs Difficulty yes   Walking or Climbing Stairs ambulation difficulty, requires equipment   Dressing/Bathing Difficulty yes   Dressing/Bathing bathing difficulty, requires equipment;dressing difficulty, requires equipment;bathing difficulty, assistance 1 person;dressing difficulty, assistance 1 person   Do you have any problems with: Errands/Grocery;Needs other help   Equipment Currently Used at Home crutches   Readmission within 30 days? No   Patient currently being followed by outpatient case management? No   Do you currently have service(s) that help you manage your care at home? No   Do you take prescription medications? Yes   Do you have prescription coverage? Yes   Do you have any problems affording any of your prescribed medications? No   Is the patient taking medications as prescribed? yes   Who is going to help you get home at discharge?  - Javan [phone]   How do you get to doctors appointments? car, drives self   Are you on dialysis? No   Do you take coumadin? No   Discharge Plan A Skilled Nursing Facility   Discharge Plan B Skilled Nursing Facility   DME Needed Upon Discharge  walker, rolling;bedside commode;hip kit   Discharge Plan discussed with: Patient   Transition of Care Barriers None     S/p ORIF calcaneus ( Dr Selby) & THR ( Dr Richards). TTWB L. Spk w pt -- std  will be minimal asst due to his medical issues (  hx CVA & Ca) & kids work. No hh.  No dme-- pt will need BSC & RW.  Discuss dcp options. Pt agreed to swing bed eval. Foc obtained.     Called referral to [facility].await auth.     Contact# Javan [phone].     Pcp: Dr. Melina Akins

## 2024-01-26 NOTE — PT/OT/SLP PROGRESS
Physical Therapy Treatment    Patient Name:  Che Kendall   MRN:  [id number]    Recommendations:     Discharge Recommendations: Low Intensity Therapy  Discharge Equipment Recommendations: walker, rolling  Barriers to discharge:  impaired mobility     Assessment:     Che Kendall is a 68 y.o. female admitted with a medical diagnosis of Closed fracture of neck of left femur.  She presents with the following impairments/functional limitations: weakness, impaired endurance, impaired functional mobility, decreased lower extremity function, pain, decreased ROM, edema, orthopedic precautions .    Rehab Prognosis: Good; patient would benefit from acute skilled PT services to address these deficits and reach maximum level of function.    Recent Surgery: Procedure(s) (LRB):  ROBOTIC ARTHROPLASTY,HIP (Left) 1 Day Post-Op    Plan:     During this hospitalization, patient to be seen BID to address the identified rehab impairments via gait training, therapeutic activities, therapeutic exercises and progress toward the following goals:    Plan of Care Expires:  01/31/24    Subjective     Chief Complaint: tired  Patient/Family Comments/goals: get in bed.  Pain/Comfort:         Objective:     Communicated with rn prior to session.  Patient found supine with   upon PT entry to room.     General Precautions: Standard, fall  Orthopedic Precautions: LLE toe touch weight bearing, LLE posterior precautions  Braces:    Respiratory Status: Room air     Functional Mobility:  Bed Mobility:     Supine to Sit: contact guard assistance  Sit to Supine: contact guard assistance  Transfers:     Sit to Stand:  contact guard assistance with rolling walker  Bed to Chair: contact guard assistance with  rolling walker  using  Step Transfer  Gait: pt amb 120ft w/rw and cga for safety.         Patient left supine with all lines intact and call button in reach..    GOALS:   Multidisciplinary Problems       Physical Therapy Goals          Problem:  Physical Therapy    Goal Priority Disciplines Outcome Goal Variances Interventions   Physical Therapy Goal     PT, PT/OT Ongoing, Progressing     Description: Pt will improve functional independence by performing:    Bed mobility: SBA  Sit to stand: SBA with rolling walker  Bed to chair t/f: SBA with Stand Step  with rolling walker  Ambulation x 200'  with SBA with rolling walker  1 Step (Curb): Min A  with rolling walker  3 Steps: Min A  with B HR  Independent with total hip HEP                        Time Tracking:     PT Received On:    PT Start Time: 1327     PT Stop Time: 1337  PT Total Time (min): 10 min     Billable Minutes: Gait Training 10    Treatment Type: Treatment  PT/PTA: PTA     Number of PTA visits since last PT visit: 1 01/26/2024

## 2024-01-26 NOTE — PROGRESS NOTES
"No acute events overnight.  Pain controlled.  Resting in bed.    Vital Signs  Temp: 98 °F (36.7 °C)  Temp Source: Oral  Pulse: 94  Heart Rate Source: Monitor  Resp: 18  SpO2: (!) 91 %  Pulse Oximetry Type: Continuous  Flow (L/min): 10  Oxygen Concentration (%): 80  Device (Oxygen Therapy): room air  BP: 119/74  BP Location: Left arm  BP Method: Automatic  Patient Position: Lying  Arousal Level: opens eyes spontaneously  Height and Weight  Height: 5' 8" (172.7 cm)  Height Method: Stated  Weight: 65.8 kg (145 lb)  Weight Method: Bed Scale  Dosing Weight: 72.6 kg (160 lb)  BSA (Calculated - sq m): 1.78 sq meters  BMI (Calculated): 22.1  Weight in (lb) to have BMI = 25: 164.1]    +FHL/EHL  Brisk capillary refill distally  Dressing c/d/i  Sensation intact to light touch distally    Recent Lab Results         01/26/24  0524   01/25/24  1231        Anion Gap 6.0         BUN 11.3         BUN/CREAT RATIO 16         Calcium 8.3         Chloride 113         CO2 22         Creatinine 0.69         eGFR >60         Glucose 102         Hematocrit 27.7   29.5       Hemoglobin 9.3   9.9       MCH 33.5         MCHC 33.6         MCV 99.6         MPV 10.3         nRBC 0.0         Platelet Count 171         Potassium 4.2         RBC 2.78         RDW 13.1         Sodium 141         WBC 12.18                 A/P:  Status post left STEPHON and ORIF left calcaneus fx  Overall patient doing well.  Therapy for mobility and ambulation.  lovenox for DVT Ppx  PT and possible discharge home over the weekend with home health care    "

## 2024-01-26 NOTE — PT/OT/SLP PROGRESS
Physical Therapy Treatment    Patient Name:  Che Kendall   MRN:  [id number]    Recommendations:     Discharge Recommendations: Low Intensity Therapy  Discharge Equipment Recommendations: walker, rolling  Barriers to discharge:  impaired mobility     Assessment:     Che Kendall is a 68 y.o. female admitted with a medical diagnosis of Closed fracture of neck of left femur.  She presents with the following impairments/functional limitations: weakness, impaired endurance, impaired functional mobility, decreased lower extremity function, pain, decreased ROM, edema, orthopedic precautions .    Rehab Prognosis: Good; patient would benefit from acute skilled PT services to address these deficits and reach maximum level of function.    Recent Surgery: Procedure(s) (LRB):  ROBOTIC ARTHROPLASTY,HIP (Left) 1 Day Post-Op    Plan:     During this hospitalization, patient to be seen BID to address the identified rehab impairments via gait training, therapeutic activities, therapeutic exercises and progress toward the following goals:    Plan of Care Expires:  01/31/24    Subjective     Chief Complaint: pain  Patient/Family Comments/goals: n/a  Pain/Comfort:         Objective:     Communicated with rn prior to session.  Patient found supine with   upon PT entry to room.     General Precautions: Standard, fall  Orthopedic Precautions: LLE toe touch weight bearing, LLE posterior precautions  Braces:    Respiratory Status: Room air     Functional Mobility:  Bed Mobility:     Supine to Sit: contact guard assistance  Transfers:     Sit to Stand:  contact guard assistance with rolling walker  Bed to Chair: contact guard assistance with  rolling walker  using  Step Transfer  Gait: pt amb 60ft w/rw and cga for safety. Pt maintained wb precautions.         Treatment & Education:  Pt preformed seated ex 10x.     Patient left up in chair with all lines intact and call button in reach..    GOALS:   Multidisciplinary Problems        Physical Therapy Goals          Problem: Physical Therapy    Goal Priority Disciplines Outcome Goal Variances Interventions   Physical Therapy Goal     PT, PT/OT Ongoing, Progressing     Description: Pt will improve functional independence by performing:    Bed mobility: SBA  Sit to stand: SBA with rolling walker  Bed to chair t/f: SBA with Stand Step  with rolling walker  Ambulation x 200'  with SBA with rolling walker  1 Step (Curb): Min A  with rolling walker  3 Steps: Min A  with B HR  Independent with total hip HEP                        Time Tracking:     PT Received On:    PT Start Time: 1010     PT Stop Time: 1034  PT Total Time (min): 24 min     Billable Minutes: Gait Training 10 and Therapeutic Exercise 14    Treatment Type: Treatment  PT/PTA: PTA     Number of PTA visits since last PT visit: 1 01/26/2024

## 2024-01-26 NOTE — PLAN OF CARE
Problem: Physical Therapy  Goal: Physical Therapy Goal  Description: Pt will improve functional independence by performing:    Bed mobility: SBA  Sit to stand: SBA with rolling walker  Bed to chair t/f: SBA with Stand Step  with rolling walker  Ambulation x 200'  with SBA with rolling walker  1 Step (Curb): Min A  with rolling walker  3 Steps: Min A  with B HR  Independent with total hip HEP   1/26/2024 1408 by Zulma Maldonado, ANGELLA  Outcome: Ongoing, Progressing

## 2024-01-26 NOTE — PLAN OF CARE
Problem: Adult Inpatient Plan of Care  Goal: Plan of Care Review  Outcome: Ongoing, Progressing  Goal: Patient-Specific Goal (Individualized)  Outcome: Ongoing, Progressing  Goal: Absence of Hospital-Acquired Illness or Injury  Outcome: Ongoing, Progressing  Goal: Optimal Comfort and Wellbeing  Outcome: Ongoing, Progressing  Goal: Readiness for Transition of Care  Outcome: Ongoing, Progressing     Problem: Infection  Goal: Absence of Infection Signs and Symptoms  Outcome: Ongoing, Progressing     Problem: Fall Injury Risk  Goal: Absence of Fall and Fall-Related Injury  Outcome: Ongoing, Progressing     Problem: Pain Acute  Goal: Acceptable Pain Control and Functional Ability  Outcome: Ongoing, Progressing     Problem: Skin Injury Risk Increased  Goal: Skin Health and Integrity  Outcome: Ongoing, Progressing

## 2024-01-27 LAB
ANION GAP SERPL CALC-SCNC: 6 MEQ/L
BUN SERPL-MCNC: 11.2 MG/DL (ref 9.8–20.1)
CALCIUM SERPL-MCNC: 8.7 MG/DL (ref 8.4–10.2)
CHLORIDE SERPL-SCNC: 112 MMOL/L (ref 98–107)
CO2 SERPL-SCNC: 25 MMOL/L (ref 23–31)
CREAT SERPL-MCNC: 0.72 MG/DL (ref 0.55–1.02)
CREAT/UREA NIT SERPL: 16
ERYTHROCYTE [DISTWIDTH] IN BLOOD BY AUTOMATED COUNT: 13.1 % (ref 11.5–17)
GFR SERPLBLD CREATININE-BSD FMLA CKD-EPI: >60 MLS/MIN/1.73/M2
GLUCOSE SERPL-MCNC: 86 MG/DL (ref 82–115)
HCT VFR BLD AUTO: 28.4 % (ref 37–47)
HGB BLD-MCNC: 9.6 G/DL (ref 12–16)
MCH RBC QN AUTO: 32.7 PG (ref 27–31)
MCHC RBC AUTO-ENTMCNC: 33.8 G/DL (ref 33–36)
MCV RBC AUTO: 96.6 FL (ref 80–94)
NRBC BLD AUTO-RTO: 0 %
PLATELET # BLD AUTO: 226 X10(3)/MCL (ref 130–400)
PMV BLD AUTO: 10.2 FL (ref 7.4–10.4)
POTASSIUM SERPL-SCNC: 3.4 MMOL/L (ref 3.5–5.1)
RBC # BLD AUTO: 2.94 X10(6)/MCL (ref 4.2–5.4)
SODIUM SERPL-SCNC: 143 MMOL/L (ref 136–145)
WBC # SPEC AUTO: 10.78 X10(3)/MCL (ref 4.5–11.5)

## 2024-01-27 PROCEDURE — 99900035 HC TECH TIME PER 15 MIN (STAT)

## 2024-01-27 PROCEDURE — 25000003 PHARM REV CODE 250: Performed by: SPECIALIST

## 2024-01-27 PROCEDURE — 63600175 PHARM REV CODE 636 W HCPCS: Performed by: SPECIALIST

## 2024-01-27 PROCEDURE — 97530 THERAPEUTIC ACTIVITIES: CPT

## 2024-01-27 PROCEDURE — 85027 COMPLETE CBC AUTOMATED: CPT | Performed by: SPECIALIST

## 2024-01-27 PROCEDURE — 97116 GAIT TRAINING THERAPY: CPT

## 2024-01-27 PROCEDURE — 80048 BASIC METABOLIC PNL TOTAL CA: CPT | Performed by: SPECIALIST

## 2024-01-27 PROCEDURE — 97530 THERAPEUTIC ACTIVITIES: CPT | Mod: CQ

## 2024-01-27 PROCEDURE — 11000001 HC ACUTE MED/SURG PRIVATE ROOM

## 2024-01-27 RX ORDER — ACETAMINOPHEN 500 MG
500 TABLET ORAL
Status: DISCONTINUED | OUTPATIENT
Start: 2024-01-27 | End: 2024-01-29 | Stop reason: HOSPADM

## 2024-01-27 RX ADMIN — TRAMADOL HYDROCHLORIDE 50 MG: 50 TABLET, COATED ORAL at 04:01

## 2024-01-27 RX ADMIN — ACETAMINOPHEN 500 MG: 500 TABLET ORAL at 04:01

## 2024-01-27 RX ADMIN — TRAMADOL HYDROCHLORIDE 50 MG: 50 TABLET, COATED ORAL at 08:01

## 2024-01-27 RX ADMIN — TRAMADOL HYDROCHLORIDE 50 MG: 50 TABLET, COATED ORAL at 05:01

## 2024-01-27 RX ADMIN — ACETAMINOPHEN 500 MG: 500 TABLET ORAL at 08:01

## 2024-01-27 RX ADMIN — ACETAMINOPHEN 500 MG: 500 TABLET ORAL at 11:01

## 2024-01-27 RX ADMIN — TRAMADOL HYDROCHLORIDE 50 MG: 50 TABLET, COATED ORAL at 11:01

## 2024-01-27 RX ADMIN — CITALOPRAM HYDROBROMIDE 40 MG: 20 TABLET ORAL at 08:01

## 2024-01-27 RX ADMIN — ACETAMINOPHEN 500 MG: 500 TABLET ORAL at 01:01

## 2024-01-27 RX ADMIN — FAMOTIDINE 20 MG: 20 TABLET ORAL at 08:01

## 2024-01-27 RX ADMIN — LACTULOSE 20 G: 20 SOLUTION ORAL at 01:01

## 2024-01-27 RX ADMIN — TRAZODONE HYDROCHLORIDE 100 MG: 50 TABLET ORAL at 08:01

## 2024-01-27 RX ADMIN — DOCUSATE SODIUM 200 MG: 100 CAPSULE, LIQUID FILLED ORAL at 08:01

## 2024-01-27 RX ADMIN — ACETAMINOPHEN 500 MG: 500 TABLET ORAL at 05:01

## 2024-01-27 RX ADMIN — ENOXAPARIN SODIUM 40 MG: 100 INJECTION SUBCUTANEOUS at 06:01

## 2024-01-27 RX ADMIN — SENNOSIDES AND DOCUSATE SODIUM 2 TABLET: 50; 8.6 TABLET ORAL at 08:01

## 2024-01-27 NOTE — PT/OT/SLP PROGRESS
Physical Therapy Treatment    Patient Name:  Che Kendall   MRN:  [id number]    Recommendations:     Discharge Recommendations: Low Intensity Therapy  Discharge Equipment Recommendations: walker, rolling  Barriers to discharge: None    Assessment:     Che Kendall is a 68 y.o. female admitted with a medical diagnosis of Closed fracture of neck of left femur.  She presents with the following impairments/functional limitations: weakness, impaired endurance, impaired self care skills, impaired functional mobility, gait instability, impaired balance, decreased lower extremity function, pain, decreased ROM, orthopedic precautions.    Rehab Prognosis: Good; patient would benefit from acute skilled PT services to address these deficits and reach maximum level of function.    Recent Surgery: Procedure(s) (LRB):  ROBOTIC ARTHROPLASTY,HIP (Left) 2 Days Post-Op    Plan:     During this hospitalization, patient to be seen BID to address the identified rehab impairments via gait training, therapeutic activities, therapeutic exercises and progress toward the following goals:    Plan of Care Expires:  01/31/24    Subjective     Chief Complaint: L hip pain  Patient/Family Comments/goals: none reported  Pain/Comfort:  Pain Rating 1: 5/10  Location - Side 1: Left  Location 1: hip  Pain Addressed 1: Pre-medicate for activity, Reposition, Distraction      Objective:     Communicated with RN prior to session.  Patient found up in chair with peripheral IV upon PT entry to room.     General Precautions: Standard, fall  Orthopedic Precautions: LLE toe touch weight bearing, LLE posterior precautions  Braces: N/A (cam boot LLE)  Respiratory Status: Room air     Functional Mobility:  Transfers:     Sit to Stand: x2 reps; contact guard assistance with rolling walker  Bed to Chair: contact guard assistance with  rolling walker  using  Hop-to Transfer  Gait: The pt ambulated ~50ft x 2 with RW and CGA, seated rest break in between.  "    Treatment & Education:  Pt able to recall 2/3 posterior hip precautions. Required cues for "no twisting".    Patient left up in chair with all lines intact and call button in reach..    GOALS:   Multidisciplinary Problems       Physical Therapy Goals          Problem: Physical Therapy    Goal Priority Disciplines Outcome Goal Variances Interventions   Physical Therapy Goal     PT, PT/OT Ongoing, Progressing     Description: Pt will improve functional independence by performing:    Bed mobility: SBA  Sit to stand: SBA with rolling walker  Bed to chair t/f: SBA with Stand Step  with rolling walker  Ambulation x 200'  with SBA with rolling walker  1 Step (Curb): Min A  with rolling walker  3 Steps: Min A  with B HR  Independent with total hip HEP                        Time Tracking:     PT Received On:    PT Start Time: 0745     PT Stop Time: 0800  PT Total Time (min): 15 min     Billable Minutes: Gait Training 15    Treatment Type: Treatment  PT/PTA: PT     Number of PTA visits since last PT visit: 2     01/27/2024  "

## 2024-01-27 NOTE — PT/OT/SLP PROGRESS
"Occupational Therapy   Treatment    Name: Che Kendall  MRN: [id number]  Admitting Diagnosis:  Closed fracture of neck of left femur  2 Days Post-Op    Recommendations:     Discharge Recommendations: High Intensity Therapy  Discharge Equipment Recommendations:  walker, rolling, 3-in-1 commode  Barriers to discharge:       Assessment:     Che Kendall is a 68 y.o. female with a medical diagnosis of Closed fracture of neck of left femur. Performance deficits affecting function are weakness, impaired endurance, impaired self care skills, impaired functional mobility, gait instability, impaired balance, decreased lower extremity function, decreased ROM, edema, pain, decreased safety awareness.     Rehab Prognosis:  Good; patient would benefit from acute skilled OT services to address these deficits and reach maximum level of function.       Plan:     Patient to be seen daily (QD-BID) to address the above listed problems via self-care/home management, therapeutic activities, therapeutic exercises  Plan of Care Expires: 02/01/24  Plan of Care Reviewed with: patient    Subjective     Chief Complaint: "This is such a different kind of pain."  Patient/Family Comments/goals: "I hope to go home today."  Pain/Comfort:  Pain Rating 1: 2/10  Location - Side 1: Left  Location 1: hip  Pain Addressed 1: Pre-medicate for activity, Distraction  Pain Rating Post-Intervention 1: 3/10    Objective:     Communicated with: Nursing prior to session.  Patient found up in chair with peripheral IV upon OT entry to room.    General Precautions: Standard, fall    Orthopedic Precautions:LLE toe touch weight bearing, LLE posterior precautions  Braces: N/A  Respiratory Status: Room air     Occupational Performance:     Functional Mobility/Transfers:  Patient completed Sit <> Stand Transfer with contact guard assistance  with  rolling walker   Patient completed walk-in shower transfer using back-in method using grab bars and RW with min A d/t " decreased balance.   Patient completed car t/f with RW with min A for LLE management d/t increased pain with moving it; pt demo ability to adhere to posterior hip precautions.    Treatment & Education:  Pt demo ability to recall 3/3 posterior hip precautions. Pt receivd education for home safety including using a non-slip mat for safety and having someone present when taking a shower until pt's confidence and safety is increased. Pt verbalized all understanding.     Patient left up in chair with all lines intact, call button in reach, and nursing notified    GOALS:   Multidisciplinary Problems       Occupational Therapy Goals          Problem: Occupational Therapy    Goal Priority Disciplines Outcome Interventions   Occupational Therapy Goal     OT, PT/OT Ongoing, Progressing    Description: Pt will perform LB dressing contact guard assist by d/c.  Pt will perform toileting SBA by d/c.  Pt will perform toilet t/f SBA by d/c.  Pt will perform shower t/f contact guard assist by d/c.  Pt will perform car t/f stand by assist in adherence to pxns by d/c.                        Time Tracking:     OT Date of Treatment: 01/27/24  OT Start Time: 1010  OT Stop Time: 1033  OT Total Time (min): 23 min    Billable Minutes:Therapeutic Activity 23    OT/MEKA: OT          1/27/2024

## 2024-01-27 NOTE — PT/OT/SLP PROGRESS
Physical Therapy Treatment    Patient Name:  Che Kendall   MRN:  [id number]    Recommendations:     Discharge Recommendations: Low Intensity Therapy  Discharge Equipment Recommendations: walker, rolling  Barriers to discharge: None    Assessment:     Che Kendall is a 68 y.o. female admitted with a medical diagnosis of Closed fracture of neck of left femur.  She presents with the following impairments/functional limitations: weakness, impaired endurance, impaired functional mobility, gait instability, impaired balance, decreased lower extremity function, pain, decreased ROM, edema, orthopedic precautions .    Rehab Prognosis: Good; patient would benefit from acute skilled PT services to address these deficits and reach maximum level of function.    Recent Surgery: Procedure(s) (LRB):  ROBOTIC ARTHROPLASTY,HIP (Left) 2 Days Post-Op    Plan:     During this hospitalization, patient to be seen BID to address the identified rehab impairments via gait training, therapeutic activities and progress toward the following goals:    Plan of Care Expires:  01/31/24    Subjective     Chief Complaint: L thigh pain; Requesting to use restroom upon entry  Patient/Family Comments/goals:   Pain/Comfort:  Pain Rating 1: 6/10  Location - Side 1: Left  Location 1: thigh  Pain Addressed 1: Cessation of Activity, Reposition      Objective:     Communicated with NASRIN Jeffrey prior to session.  Patient found HOB elevated with peripheral IV upon PT entry to room.     General Precautions: Standard, fall  Orthopedic Precautions: LLE toe touch weight bearing, LLE posterior precautions  Braces: N/A (cam boot LLE)  Respiratory Status: Room air     Functional Mobility:  Bed Mobility:     Supine to Sit: stand by assistance  Transfers:     Sit to Stand:  contact guard assistance with rolling walker  Toilet Transfer: contact guard assistance with  rolling walker  using  Step Transfer  Gait: 70 ft with RW with CGA  Attempted 4 inch curb with RW  but unable to hop onto curb while maintaining WB precautions with reports of UE weakness      AM-PAC 6 CLICK MOBILITY          Treatment & Education:      Patient left up in chair with all lines intact, call button in reach, and family present..    GOALS:   Multidisciplinary Problems       Physical Therapy Goals          Problem: Physical Therapy    Goal Priority Disciplines Outcome Goal Variances Interventions   Physical Therapy Goal     PT, PT/OT Ongoing, Progressing     Description: Pt will improve functional independence by performing:    Bed mobility: SBA  Sit to stand: SBA with rolling walker  Bed to chair t/f: SBA with Stand Step  with rolling walker  Ambulation x 200'  with SBA with rolling walker  1 Step (Curb): Min A  with rolling walker  3 Steps: Min A  with B HR  Independent with total hip HEP                        Time Tracking:     PT Received On:    PT Start Time: 1315     PT Stop Time: 1345  PT Total Time (min): 30 min     Billable Minutes: Gait Training 15 and Therapeutic Activity 15    Treatment Type: Treatment  PT/PTA: PTA     Number of PTA visits since last PT visit: 2     01/27/2024

## 2024-01-27 NOTE — PROGRESS NOTES
"Ortho Progress Note    No acute events overnight.  Pain controlled.  Resting in bed. Eating lunch comfortably    Vital Signs  Temp: 98 °F (36.7 °C)  Temp Source: Oral  Pulse: 101  Heart Rate Source: Monitor  Resp: 16  SpO2: 96 %  Pulse Oximetry Type: Intermittent  Flow (L/min): 2  Oxygen Concentration (%): 28  Device (Oxygen Therapy): nasal cannula  BP: 130/74  BP Location: Left arm  BP Method: Automatic  Patient Position: Lying  Arousal Level: opens eyes spontaneously  Height and Weight  Height: 5' 8" (172.7 cm)  Height Method: Stated  Weight: 65.8 kg (145 lb)  Weight Method: Bed Scale  Dosing Weight: 72.6 kg (160 lb)  BSA (Calculated - sq m): 1.78 sq meters  BMI (Calculated): 22.1  Weight in (lb) to have BMI = 25: 164.1]    +FHL/EHL  Brisk capillary refill distally  Dressing c/d/i  Sensation intact to light touch distally    Recent Lab Results         01/27/24  0514        Anion Gap 6.0       BUN 11.2       BUN/CREAT RATIO 16       Calcium 8.7       Chloride 112       CO2 25       Creatinine 0.72       eGFR >60       Glucose 86       Hematocrit 28.4       Hemoglobin 9.6       MCH 32.7       MCHC 33.8       MCV 96.6       MPV 10.2       nRBC 0.0       Platelet Count 226       Potassium 3.4       RBC 2.94       RDW 13.1       Sodium 143       WBC 10.78               A/P:  Status post left STEPHON and ORIF left calcaneus fx  Overall patient doing well.  Therapy for mobility and ambulation.  lovenox for DVT Ppx  PT and possible discharge home over the weekend with home health care  "

## 2024-01-27 NOTE — PLAN OF CARE
Problem: Adult Inpatient Plan of Care  Goal: Plan of Care Review  Outcome: Ongoing, Progressing  Flowsheets (Taken 1/26/2024 2214)  Plan of Care Reviewed With: patient     Problem: Infection  Goal: Absence of Infection Signs and Symptoms  Outcome: Ongoing, Progressing  Intervention: Prevent or Manage Infection  Flowsheets (Taken 1/26/2024 2214)  Infection Management: aseptic technique maintained     Problem: Pain Acute  Goal: Acceptable Pain Control and Functional Ability  Outcome: Ongoing, Progressing  Intervention: Develop Pain Management Plan  Flowsheets (Taken 1/26/2024 2214)  Pain Management Interventions:   medication offered   pillow support provided   position adjusted   relaxation techniques promoted   quiet environment facilitated

## 2024-01-28 PROCEDURE — 94761 N-INVAS EAR/PLS OXIMETRY MLT: CPT

## 2024-01-28 PROCEDURE — 25000003 PHARM REV CODE 250: Performed by: SPECIALIST

## 2024-01-28 PROCEDURE — 97535 SELF CARE MNGMENT TRAINING: CPT | Mod: CO

## 2024-01-28 PROCEDURE — 11000001 HC ACUTE MED/SURG PRIVATE ROOM

## 2024-01-28 PROCEDURE — 97110 THERAPEUTIC EXERCISES: CPT

## 2024-01-28 PROCEDURE — 99900035 HC TECH TIME PER 15 MIN (STAT)

## 2024-01-28 PROCEDURE — 97530 THERAPEUTIC ACTIVITIES: CPT | Mod: CO

## 2024-01-28 PROCEDURE — 63600175 PHARM REV CODE 636 W HCPCS: Performed by: SPECIALIST

## 2024-01-28 PROCEDURE — 97116 GAIT TRAINING THERAPY: CPT

## 2024-01-28 RX ADMIN — CITALOPRAM HYDROBROMIDE 40 MG: 20 TABLET ORAL at 08:01

## 2024-01-28 RX ADMIN — TRAMADOL HYDROCHLORIDE 50 MG: 50 TABLET, COATED ORAL at 05:01

## 2024-01-28 RX ADMIN — TRAZODONE HYDROCHLORIDE 100 MG: 50 TABLET ORAL at 08:01

## 2024-01-28 RX ADMIN — ACETAMINOPHEN 500 MG: 500 TABLET ORAL at 08:01

## 2024-01-28 RX ADMIN — TRAMADOL HYDROCHLORIDE 50 MG: 50 TABLET, COATED ORAL at 10:01

## 2024-01-28 RX ADMIN — ACETAMINOPHEN 500 MG: 500 TABLET ORAL at 12:01

## 2024-01-28 RX ADMIN — FAMOTIDINE 20 MG: 20 TABLET ORAL at 09:01

## 2024-01-28 RX ADMIN — SENNOSIDES AND DOCUSATE SODIUM 2 TABLET: 50; 8.6 TABLET ORAL at 08:01

## 2024-01-28 RX ADMIN — POLYETHYLENE GLYCOL 3350 17 G: 17 POWDER, FOR SOLUTION ORAL at 08:01

## 2024-01-28 RX ADMIN — FAMOTIDINE 20 MG: 20 TABLET ORAL at 08:01

## 2024-01-28 RX ADMIN — ACETAMINOPHEN 500 MG: 500 TABLET ORAL at 11:01

## 2024-01-28 RX ADMIN — ACETAMINOPHEN 500 MG: 500 TABLET ORAL at 05:01

## 2024-01-28 RX ADMIN — ENOXAPARIN SODIUM 40 MG: 100 INJECTION SUBCUTANEOUS at 06:01

## 2024-01-28 RX ADMIN — ACETAMINOPHEN 500 MG: 500 TABLET ORAL at 09:01

## 2024-01-28 RX ADMIN — TRAMADOL HYDROCHLORIDE 50 MG: 50 TABLET, COATED ORAL at 11:01

## 2024-01-28 NOTE — PT/OT/SLP PROGRESS
Physical Therapy Treatment    Patient Name:  Che Kendall   MRN:  [id number]    Recommendations:     Discharge Recommendations: Low Intensity Therapy  Discharge Equipment Recommendations: walker, rolling  Barriers to discharge: None    Assessment:     Che Kendall is a 69 y.o. female admitted with a medical diagnosis of Closed fracture of neck of left femur.  She presents with the following impairments/functional limitations: weakness, impaired endurance, impaired functional mobility, gait instability, impaired balance, decreased lower extremity function, pain, decreased ROM, edema, orthopedic precautions .    Rehab Prognosis: Good; patient would benefit from acute skilled PT services to address these deficits and reach maximum level of function.    Recent Surgery: Procedure(s) (LRB):  ROBOTIC ARTHROPLASTY,HIP (Left) 3 Days Post-Op    Plan:     During this hospitalization, patient to be seen BID to address the identified rehab impairments via gait training, therapeutic activities and progress toward the following goals:    Plan of Care Expires:  01/31/24    Subjective     Chief Complaint: RLE discomfort today  Patient/Family Comments/goals: none reported  Pain/Comfort:  Pain Rating 1: other (see comments) (unrated)  Location - Side 1: Left  Location 1: hip  Pain Addressed 1: Reposition, Distraction      Objective:     Communicated with RN prior to session.  Patient found HOB elevated with peripheral IV upon PT entry to room.     General Precautions: Standard, fall  Orthopedic Precautions: LLE toe touch weight bearing, LLE posterior precautions  Braces: N/A (cam boot LLE)  Respiratory Status: Room air     Functional Mobility:  Bed Mobility:     Supine to Sit: supervision  Transfers:     Sit to Stand:  stand by assistance with standard walker  Bed to Chair: stand by assistance with  rolling walker  using  hop-to transfer  Gait: The pt ambulated ~70 ft, ~50 ft with RW and SBA-CGA; seated rest break in between.  "      Treatment & Education:  The pt performed x10 reps of total hip HEP on LLE.  Pt able to recall 2/3 posterior hip precautions. Required cuing for "no crossing".     The pt was educated on PT plan of care, safety with mobility, orthopedic precautions, and importance of frequent mobility.     Patient left up in chair with all lines intact and call button in reach..    GOALS:   Multidisciplinary Problems       Physical Therapy Goals          Problem: Physical Therapy    Goal Priority Disciplines Outcome Goal Variances Interventions   Physical Therapy Goal     PT, PT/OT Ongoing, Progressing     Description: Pt will improve functional independence by performing:    Bed mobility: SBA - met  Sit to stand: SBA with rolling walker - met  Bed to chair t/f: SBA with Stand Step  with rolling walker - met  Ambulation x 200'  with SBA with rolling walker  1 Step (Curb): Min A  with rolling walker  3 Steps: Min A  with B HR  Independent with total hip HEP - met                       Time Tracking:     PT Received On:    PT Start Time: 1127     PT Stop Time: 1150  PT Total Time (min): 23 min     Billable Minutes: Gait Training 15 and Therapeutic Exercise 8    Treatment Type: Treatment  PT/PTA: PT     Number of PTA visits since last PT visit: 0     01/28/2024  "

## 2024-01-28 NOTE — PLAN OF CARE
Problem: Physical Therapy  Goal: Physical Therapy Goal  Description: Pt will improve functional independence by performing:    Bed mobility: SBA - met  Sit to stand: SBA with rolling walker - met  Bed to chair t/f: SBA with Stand Step  with rolling walker - met  Ambulation x 200'  with SBA with rolling walker  1 Step (Curb): Min A  with rolling walker  3 Steps: Min A  with B HR  Independent with total hip HEP   Outcome: Ongoing, Progressing

## 2024-01-28 NOTE — PLAN OF CARE
Problem: Adult Inpatient Plan of Care  Goal: Plan of Care Review  Outcome: Ongoing, Progressing  Flowsheets (Taken 1/27/2024 2056)  Plan of Care Reviewed With: patient     Problem: Fall Injury Risk  Goal: Absence of Fall and Fall-Related Injury  Outcome: Ongoing, Progressing  Intervention: Identify and Manage Contributors  Flowsheets (Taken 1/27/2024 2056)  Self-Care Promotion:   BADL personal objects within reach   independence encouraged   safe use of adaptive equipment encouraged  Medication Review/Management: medications reviewed     Problem: Pain Acute  Goal: Acceptable Pain Control and Functional Ability  Outcome: Ongoing, Progressing  Intervention: Develop Pain Management Plan  Flowsheets (Taken 1/27/2024 2056)  Pain Management Interventions:   medication offered   pillow support provided   position adjusted   quiet environment facilitated   relaxation techniques promoted

## 2024-01-28 NOTE — PT/OT/SLP PROGRESS
"Occupational Therapy   Treatment    Name: Che Kendall  MRN: [id number]  Admitting Diagnosis:  Closed fracture of neck of left femur  3 Days Post-Op    Recommendations:     Discharge Recommendations: High Intensity Therapy  Discharge Equipment Recommendations:  walker, rolling, 3-in-1 commode  Barriers to discharge:       Assessment:     Che Kendall is a 69 y.o. female with a medical diagnosis of Closed fracture of neck of left femur.  Performance deficits affecting function are weakness, impaired endurance, impaired self care skills, impaired functional mobility, gait instability, impaired balance, decreased lower extremity function, decreased ROM, edema, pain, decreased safety awareness.     Rehab Prognosis:  Good; patient would benefit from acute skilled OT services to address these deficits and reach maximum level of function.       Plan:     Patient to be seen daily (QD-BID) to address the above listed problems via self-care/home management, therapeutic activities, therapeutic exercises  Plan of Care Expires: 02/01/24  Plan of Care Reviewed with: patient    Subjective     Chief Complaint: "ready to go home"  Patient/Family Comments/goals:   Pain/Comfort:  Pain Rating 1: 0/10    Objective:     Communicated with: nurse prior to session.  Patient found up in chair with peripheral IV upon OT entry to room.    General Precautions: Standard, fall    Orthopedic Precautions:LLE toe touch weight bearing, LLE posterior precautions  Braces:  (CAM BOOT LLE)  Respiratory Status: Room air     Occupational Performance:       Functional Mobility/Transfers:  Patient completed Sit <> Stand Transfer with stand by assistance  with  rolling walker   Patient completed Toilet Transfer Step Transfer technique with stand by assistance with  rolling walker  Functional Mobility: pt ambulated ~70ft in hallway with RW, SBA.     Activities of Daily Living:  Grooming: modified independence pt performed oral hygiene while seated in BS " chair at sink  Toileting: supervision pt voided on BSC over toilet, completed hygiene and clothing management         Patient left up in chair with all lines intact and call button in reach    GOALS:   Multidisciplinary Problems       Occupational Therapy Goals          Problem: Occupational Therapy    Goal Priority Disciplines Outcome Interventions   Occupational Therapy Goal     OT, PT/OT Ongoing, Progressing    Description: Pt will perform LB dressing contact guard assist by d/c.  Pt will perform toileting SBA by d/c.  Pt will perform toilet t/f SBA by d/c.  Pt will perform shower t/f contact guard assist by d/c.  Pt will perform car t/f stand by assist in adherence to pxns by d/c.                        Time Tracking:     OT Date of Treatment:    OT Start Time: 0850  OT Stop Time: 0914  OT Total Time (min): 24 min    Billable Minutes:Self Care/Home Management 12  Therapeutic Activity 12    OT/MEKA: MEKA          1/28/2024

## 2024-01-28 NOTE — PROGRESS NOTES
"Ortho Progress Note    No acute events overnight.  Pain controlled.  Resting in bed. Eating lunch comfortably. It [birth year] today so family at bedside watching football game     Vital Signs  Temp: 98.6 °F (37 °C)  Temp Source: Oral  Pulse: 102  Heart Rate Source: Monitor  Resp: 19  SpO2: (!) 93 %  Pulse Oximetry Type: Intermittent  Flow (L/min): 2  Oxygen Concentration (%): 28  Device (Oxygen Therapy): room air  BP: 124/71  BP Location: Left arm  BP Method: Automatic  Patient Position: Lying  Arousal Level: opens eyes spontaneously  Height and Weight  Height: 5' 8" (172.7 cm)  Height Method: Stated  Weight: 65.8 kg (145 lb)  Weight Method: Bed Scale  Dosing Weight: 72.6 kg (160 lb)  BSA (Calculated - sq m): 1.78 sq meters  BMI (Calculated): 22.1  Weight in (lb) to have BMI = 25: 164.1]    +FHL/EHL  Brisk capillary refill distally  Dressing c/d/i  Sensation intact to light touch distally    Recent Lab Results       None            A/P:  Status post left STEPHON and ORIF left calcaneus fx  Overall patient doing well.  Therapy for mobility and ambulation.  lovenox for DVT Ppx  PT and possible discharge home with home health care tomorrow   "

## 2024-01-28 NOTE — PT/OT/SLP PROGRESS
Physical Therapy Treatment    Patient Name:  Che Kendall   MRN:  [id number]    Recommendations:     Discharge Recommendations: Low Intensity Therapy  Discharge Equipment Recommendations: walker, rolling  Barriers to discharge:  None    Assessment:     Che Kendall is a 69 y.o. female admitted with a medical diagnosis of Closed fracture of neck of left femur.  She presents with the following impairments/functional limitations: weakness, impaired endurance, impaired functional mobility, gait instability, impaired balance, decreased lower extremity function, pain, decreased ROM, edema, orthopedic precautions .    Rehab Prognosis: Good; patient would benefit from acute skilled PT services to address these deficits and reach maximum level of function.    Recent Surgery: Procedure(s) (LRB):  ROBOTIC ARTHROPLASTY,HIP (Left) 3 Days Post-Op    Plan:     During this hospitalization, patient to be seen BID to address the identified rehab impairments via gait training, therapeutic activities and progress toward the following goals:    Plan of Care Expires:  01/31/24    Subjective     Chief Complaint: some RLE discomfort today  Patient/Family Comments/goals: none reported  Pain/Comfort:  Pain Rating 1:  (unrated)  Location - Side 1: Left  Location 1: thigh  Pain Addressed 1: Pre-medicate for activity, Distraction, Reposition      Objective:     Communicated with RN prior to session.  Patient found HOB elevated with peripheral IV upon PT entry to room. Boot donned to pt's LLE prior to OOB mobility.     General Precautions: Standard, fall  Orthopedic Precautions: LLE toe touch weight bearing, LLE posterior precautions  Braces: N/A (CAM boot LLE)  Respiratory Status: Room air     Functional Mobility:  Bed Mobility:     Supine to Sit: supervision  Transfers:     Sit to Stand:  stand by assistance with rolling walker  Bed to Chair: stand by assistance with  rolling walker  using  hop-to transfer  Gait: The pt ambulated ~100 ft  with RW and SBA-CGA      Treatment & Education:  Pt educated on PT plan of care, safety with mobility, orthopedic precautions, and importance of frequent mobility.    Patient left up in chair with all lines intact and call button in reach..    GOALS:   Multidisciplinary Problems       Physical Therapy Goals          Problem: Physical Therapy    Goal Priority Disciplines Outcome Goal Variances Interventions   Physical Therapy Goal     PT, PT/OT Ongoing, Progressing     Description: Pt will improve functional independence by performing:    Bed mobility: SBA - met  Sit to stand: SBA with rolling walker - met  Bed to chair t/f: SBA with Stand Step  with rolling walker - met  Ambulation x 200'  with SBA with rolling walker  1 Step (Curb): Min A  with rolling walker  3 Steps: Min A  with B HR  Independent with total hip HEP                        Time Tracking:     PT Received On:    PT Start Time: 0740     PT Stop Time: 0755  PT Total Time (min): 15 min     Billable Minutes: Gait Training 15    Treatment Type: Treatment  PT/PTA: PT     Number of PTA visits since last PT visit: 0     01/28/2024

## 2024-01-28 NOTE — PLAN OF CARE
Problem: Physical Therapy  Goal: Physical Therapy Goal  Description: Pt will improve functional independence by performing:    Bed mobility: SBA - met  Sit to stand: SBA with rolling walker - met  Bed to chair t/f: SBA with Stand Step  with rolling walker - met  Ambulation x 200'  with SBA with rolling walker  1 Step (Curb): Min A  with rolling walker  3 Steps: Min A  with B HR  Independent with total hip HEP - met  1/28/2024 1213 by Mally Eisenberg, PT  Outcome: Ongoing, Progressing

## 2024-01-29 VITALS
DIASTOLIC BLOOD PRESSURE: 74 MMHG | SYSTOLIC BLOOD PRESSURE: 116 MMHG | WEIGHT: 145 LBS | RESPIRATION RATE: 18 BRPM | HEIGHT: 68 IN | TEMPERATURE: 98 F | OXYGEN SATURATION: 99 % | HEART RATE: 86 BPM | BODY MASS INDEX: 21.98 KG/M2

## 2024-01-29 PROCEDURE — 97530 THERAPEUTIC ACTIVITIES: CPT

## 2024-01-29 PROCEDURE — 97535 SELF CARE MNGMENT TRAINING: CPT

## 2024-01-29 PROCEDURE — 63600175 PHARM REV CODE 636 W HCPCS: Performed by: SPECIALIST

## 2024-01-29 PROCEDURE — 97530 THERAPEUTIC ACTIVITIES: CPT | Mod: CQ

## 2024-01-29 PROCEDURE — 25000003 PHARM REV CODE 250: Performed by: SPECIALIST

## 2024-01-29 RX ORDER — METHOCARBAMOL 750 MG/1
750 TABLET, FILM COATED ORAL EVERY 8 HOURS PRN
Qty: 42 TABLET | Refills: 0 | Status: SHIPPED | OUTPATIENT
Start: 2024-01-29 | End: 2024-02-12

## 2024-01-29 RX ORDER — NAPROXEN SODIUM 220 MG/1
81 TABLET, FILM COATED ORAL 2 TIMES DAILY
Qty: 84 TABLET | Refills: 0 | Status: SHIPPED | OUTPATIENT
Start: 2024-01-29 | End: 2024-03-11

## 2024-01-29 RX ORDER — AMOXICILLIN 250 MG
2 CAPSULE ORAL 2 TIMES DAILY
Qty: 56 TABLET | Refills: 0 | Status: SHIPPED | OUTPATIENT
Start: 2024-01-29 | End: 2024-02-12

## 2024-01-29 RX ORDER — ACETAMINOPHEN 500 MG
500 TABLET ORAL EVERY 4 HOURS
Qty: 84 TABLET | Refills: 0 | Status: SHIPPED | OUTPATIENT
Start: 2024-01-29 | End: 2024-02-12

## 2024-01-29 RX ORDER — TRAMADOL HYDROCHLORIDE 50 MG/1
50 TABLET ORAL EVERY 4 HOURS PRN
Qty: 42 TABLET | Refills: 0 | Status: SHIPPED | OUTPATIENT
Start: 2024-01-29 | End: 2024-02-05

## 2024-01-29 RX ADMIN — ACETAMINOPHEN 500 MG: 500 TABLET ORAL at 08:01

## 2024-01-29 RX ADMIN — TRAMADOL HYDROCHLORIDE 50 MG: 50 TABLET, COATED ORAL at 08:01

## 2024-01-29 RX ADMIN — ENOXAPARIN SODIUM 40 MG: 100 INJECTION SUBCUTANEOUS at 06:01

## 2024-01-29 RX ADMIN — ACETAMINOPHEN 500 MG: 500 TABLET ORAL at 01:01

## 2024-01-29 RX ADMIN — ACETAMINOPHEN 500 MG: 500 TABLET ORAL at 04:01

## 2024-01-29 RX ADMIN — TRAMADOL HYDROCHLORIDE 50 MG: 50 TABLET, COATED ORAL at 01:01

## 2024-01-29 RX ADMIN — FAMOTIDINE 20 MG: 20 TABLET ORAL at 08:01

## 2024-01-29 RX ADMIN — TRAMADOL HYDROCHLORIDE 50 MG: 50 TABLET, COATED ORAL at 04:01

## 2024-01-29 RX ADMIN — DOCUSATE SODIUM 200 MG: 100 CAPSULE, LIQUID FILLED ORAL at 08:01

## 2024-01-29 RX ADMIN — SENNOSIDES AND DOCUSATE SODIUM 2 TABLET: 50; 8.6 TABLET ORAL at 08:01

## 2024-01-29 NOTE — PLAN OF CARE
Problem: Occupational Therapy  Goal: Occupational Therapy Goal  Description:   Pt will perform LB dressing contact guard assist by d/c. MET  Pt will perform toileting SBA by d/c. MET  Pt will perform toilet t/f SBA by d/c. MET  Pt will perform shower t/f contact guard assist by d/c. MET  Pt will perform car t/f stand by assist in adherence to pxns by d/c. MET   Outcome: Met

## 2024-01-29 NOTE — PT/OT/SLP PROGRESS
"Occupational Therapy   Treatment    Name: Che Kendall  MRN: [id number]  Admitting Diagnosis:  Closed fracture of neck of left femur  4 Days Post-Op    Recommendations:     Discharge Recommendations: High Intensity Therapy  Discharge Equipment Recommendations:  walker, rolling, 3-in-1 commode  Barriers to discharge:       Assessment:     Che Kendall is a 69 y.o. female with a medical diagnosis of Closed fracture of neck of left femur. Performance deficits affecting function are weakness, impaired endurance, impaired self care skills, impaired functional mobility, gait instability, impaired balance, decreased lower extremity function, decreased safety awareness, orthopedic precautions, edema.     Rehab Prognosis:  Good; patient would benefit from acute skilled OT services to address these deficits and reach maximum level of function.       Plan:     Patient to be seen daily (QD-BID) to address the above listed problems via self-care/home management, therapeutic activities, therapeutic exercises  Plan of Care Expires: 02/01/24  Plan of Care Reviewed with: patient    Subjective     Chief Complaint: "I'm sure it'll start hurting when I get up."  Patient/Family Comments/goals: "I think I'm ready to go home."  Pain/Comfort:  Pain Rating 1: 0/10    Objective:     Communicated with: JORGE L Ruelas, prior to session.  Patient found up in chair with peripheral IV upon OT entry to room.    General Precautions: Standard, fall    Orthopedic Precautions:LLE toe touch weight bearing, LLE posterior precautions  Braces: N/A  Respiratory Status: Room air     Occupational Performance:     Functional Mobility/Transfers:  Patient completed Sit <> Stand Transfer with supervision  with  rolling walker   Patient completed  Shower Transfer Step Transfer technique with stand by assistance with rolling walker  Patient completed car transfer with RW and SBA while adhering to posterior hip precautions.   Functional Mobility: Pt performed FM " with RW and SBA from room to car simulation room.     Activities of Daily Living:  Lower Body Dressing: Pt required 3 verbal cues during lower body dressing to adhere to hip precautions; pt required instruction to use reacher and sock aid to kaia/doff underwear and sock; pt would benefit from continued practice for hip safety      Treatment & Education:  Pt only able to recall 1/3 posterior hip precautions this morning. Pt demonstrated increased mobility and activity tolerance but required significant verbal cues to adhere to posterior hip precautions, which indicates decreased safety awareness. Pt received education for covering surgical site in preparation for showering and use of long-handled sponge to maintain hip precautions.     Patient left up in chair with all lines intact and Marcos, PT, present    GOALS:   Multidisciplinary Problems       Occupational Therapy Goals          Problem: Occupational Therapy    Goal Priority Disciplines Outcome Interventions   Occupational Therapy Goal     OT, PT/OT Ongoing, Progressing    Description: Pt will perform LB dressing contact guard assist by d/c.  Pt will perform toileting SBA by d/c.  Pt will perform toilet t/f SBA by d/c.  Pt will perform shower t/f contact guard assist by d/c.  Pt will perform car t/f stand by assist in adherence to pxns by d/c.                        Time Tracking:     OT Date of Treatment: 01/29/24  OT Start Time: 0926  OT Stop Time: 0954  OT Total Time (min): 28 min    Billable Minutes:Self Care/Home Management 18  Therapeutic Activity 20    OT/MEKA: OT          1/29/2024

## 2024-01-29 NOTE — DISCHARGE INSTRUCTIONS
[facility]  [address]  [place] [address]  Phone [phone]       /      Fax [phone]  SURGEON: Dr. Richards (Hip) and Dr. Selby (Heel)    After discharge, all questions or concerns should be handled at your surgeon's office ([phone]). If it is a weekend or after hours, you will get the surgeon on call.     Discharge Medications:    PAIN MANAGEMENT: Next Dose Available   Tylenol/Acetaminophen 500mg- every 4 hours, around the clock (WHILE AWAKE) 530pm   Ultram/Tramadol 50mg (Pain Med) - every 4-6 hours AS NEEDED for pain 530pm   Robaxin/Methocarbamol 750mg (Muscle Relaxer) - Every 6-8 hours AS NEEDED for muscle spasms, thigh pain or additional pain control As needed    COMPLICATION PREVENTION MEDS: Next Dose DUE   Aspirin 81mg twice a day for 6 weeks post-op for blood clot prevention AM on 1/30/24   MiraLAX 17gm - once or twice a day while on narcotics and muscle relaxers for constipation prevention PM on 1/29/2024         Total Hip Replacement                                                                                                                                  PAIN MEDICATIONS/PAIN MANAGEMENT: (Use the medication log in your discharge packet to keep track of your medications)  For best wound heeling, NO anti-inflammatories (Ibuprofen, Aleve, Motrin, Naproxen, Mobic/Meloxicam, Toradol/Ketorolac, Celebrex, Diclofenac/Voltaren...etc) for 2 weeks or until the wound is healed.    Tylenol/Acetaminophen 500mg every 4 hours, around the clock (WHILE AWAKE).   Take Ultram (Tramadol) 50mg (pain pill) every 4-6 hours as needed for pain.    **NO MORE THAN 3000mg OF TYLENOL IN 24 HOURS**.     Robaxin/Methocarbamol 750mg (muscle relaxer)- you can take every 6-8 hours as needed for muscle spasms, thigh pain and stiffness, additional pain control or breakthrough pain medications. This medication is helpful for pain control while lessening your need for narcotics.  Please reduce the use gradually as the pain and spasms lessen. DO NOT TAKE AT THE SAME TIME AS A PAIN PILL. YOU WILL BE BETTER SERVED WITH 2 HOURS BETWEEN PAIN PILL AND MUSCLE RELAXER.     **Other things that help with pain control is WALKING, COMPRESSION WRAP, ICE and ELEVATION!!**    BLOOD CLOT PREVENTION:   Aspirin 81mg (blood thinner) - twice a day for 6 weeks for blood clot prevention. Start on the morning of 1/30/23. Stop on 3/12/24.  If you were taking Baby Aspirin 81mg prior to surgery, may return to daily dose AFTER 6 weeks - 3/13/24.    You need to continuing wearing your compression stocking (BOB Hose - ThromboEmbolic Disease Prevention Device) for the next 2-6 weeks post-op. It is ok to remove them for hygiene and at bedtime.   Hand wash and Dry. **If the swelling persists in the legs after you stop wearing the Bob hose, continue to wear them until the swelling decreases.**  REMOVE STOCKINGS AT LEAST DAILY FOR SKIN ASSESSMENT.   Do NOT let the stockings roll down, creating a tourniquet around the back of your knee. If you need to, leave the excess at the bottom of the stocking.   The best thing you can do to prevent blood clots is to walk around as much as possible, AT LEAST EVERY 1-2 HOURS.       CONSTIPATION PREVENTION:   Miralax or Senokot S/Ronit-Colace and Stool softeners EVERY DAY while on pain meds.  Use other more aggressive over the counter LAXATIVES as needed for constipation (Examples: Milk of Magnesia, Dulcolax tabs or suppository, Magnesium Citrate, Fleet's Enema...etc.)   Drink lots of water.  Increase Fiber in diet.  Increase walking distance each day  DO NOT GO MORE THAN 2 DAYS WITHOUT HAVING A BOWEL MOVEMENT!    ACTIVITY:   Weight bearing precautions as follows:  NON weight bearing to operative leg with walker.   HIP PRECAUTIONS:  NO Twisting  NO Leaning past 90 degrees  NO Crossing legs    DO NOT TAKE YOURSELF OFF OF THE WALKER TOO SOON. ALLOW YOUR OUTPATIENT THERAPIST or SURGEON TO  GUIDE YOU.   Change positions often throughout the day.   Walk around at least every 1-2 hours while awake.   No heavy lifting, pulling, pushing or straining.  Ice the Hip and thigh AS MUCH AS POSSIBLE  Home Health Physical Therapy at least 3 times per week. After 2 weeks, transition to outpatient physical therapy.        WOUND CARE:   LEFT FOOT/HEEL - Dry dressing changes every other day and as needed for soiling for 14 days. Start WEDNESDAY. Re-wrap with bulky dressing and ace. Reapply Walking boot.     LEFT HIP - Operative Hip poke hole - Occlusive Coverlet dressing (Small White bandage)  - change every other day and as needed for soiling. Start on WEDNESDAY. NO ointments, creams, lotions or antiseptics on the incision.      LEFT HIP - (Tan) Aquacel Dressing on Operative Hip - 7-Day dressing  Follow instructions on flyer for REMOVAL AND DRESSING CHANGE NECESSITY.     DO NOT REMOVE UNTIL DRESSING CHANGE DATE UNLESS SOILED OR THE DRESSING IS COMPROMISED.     Removal: (On 2/1/24)   Step 1: Remove the dressing - press down gently on the pad and carefully lift the edge of the dressing with the other hand   Step 2: Stretch the dressing parallel to break the adhesive seal   Step 3: Stretch all the way around the dressing   Step 4: Notify the surgeon if unable to remove the dressing and the adhesive around the wound.      NO ointments, creams, lotions or antiseptics on the incision. Once removed on the change date, apply occlusive coverlet dressing (long white bandage) and change every other day and as needed for soiling for the next 7 days.     NOTIFY MD OF EXCESSIVE WOUND DRAINAGE.      DO NOT WET WOUND or apply any ointments, creams, lotions or antiseptics.  Ace wrap - apply your compression stocking and apply the ace wrap where the stocking stops for extra added compression to the knee.   May wet incision AFTER 2 weeks- (after you follow-up with your surgeon).   Ok to shower before then if able to keep wound from  getting wet (plastic barrier, saran wrap or cling wrap and tape).   DO NOT TOUCH INCISION      URINARY RETENTION:  If you start having difficulty urinating, decrease the use of Pain pills and muscle relaxers and notify your primary care doctor.     PNEUMONIA PREVENTION:  Stay out of bed as much as possible and walk around every 1-2 hours.  Continue breathing exercises (Incentive Spirometry) every 1-2 hours while mobility is limited and while you are on pain pills.    FALL PREVENTION:  Wear sturdy shoes that fit well - Wearing shoes with high heels or slippery soles, or shoes that are too loose, can lead to falls. Walking around in bare feet, or only socks, can also increase your risk of falling.  Use walker as long as your surgeon and therapist recommend it  Use good lighting and  throw rugs, electrical cords, furniture and clutter (anything than can cause you to trip at home.   Non-slip rug in bathroom or shower    INFECTION PREVENTION:  Proper handwashing before and after dressing changes. Do not wet the wound. Wound care instructions as written above. NOTIFY MD OF EXCESSIVE WOUND DRAINAGE.  No alcohol, smoking or tobacco products  Pets should not be allowed around the wound or the dressing.   Treat UTI and skin infections as soon as possible.  Pre-medicate with antibiotics prior to dental or surgical procedures.   If you are diabetic, MAINTAIN GOOD BLOOD SUGAR CONTROL (Below 150) DURING YOUR RECOVERY. If you see high numbers, notify your primary care doctor.     Call your SURGEON'S OFFICE ([phone]) if you experience the following signs and symptoms of infection:   Unusual redness, swelling, excessive, cloudy or foul smelling drainage at the incision site.   Persistent low grade temp OR a temp greater than 102 F, unrelieved by Tylenol  Pain at surgical site, unrelieved by pain meds    Warning signs of a blood clot in your leg: (CALL YOUR SURGEON)  New onset or increasing pain in calf, new onset tenderness  or redness above or below the knee or increasing swelling of your calf, ankle, or foot.  Warning signs that a blood clot has traveled to your lungs: (REPORT TO THE ER/CALL 491)  Sudden or increase in Shortness of breath, sudden onset of chest pains, or  Localized chest pain with coughing.       IF ANY ISSUES ARISE AND YOU FEEL THE NEED TO CALL YOUR PRIMARY CARE DOCTOR, PLEASE LET YOUR SURGEON KNOW AS WELL.     For emergencies, please report to OUR ([facility] or [facility] [place]) Emergency department and tell them to call YOUR SURGEON at [phone].     BEFORE MAKING ANY CHANGES TO THE MEDICAL CARE PLAN OR GOING TO THE EMERGENCY ROOM, PLEASE CONTACT THE SURGEON.    3rd floor nursing unit # [phone]  Use this number for questions about your discharge instructions or problems filling your discharge prescriptions.

## 2024-01-29 NOTE — PT/OT/SLP PROGRESS
Physical Therapy Treatment    Patient Name:  Che Kendall   MRN:  [id number]    Recommendations:     Discharge Recommendations: Low Intensity Therapy  Discharge Equipment Recommendations: walker, rolling  Barriers to discharge: None    Assessment:     Che Kendall is a 69 y.o. female admitted with a medical diagnosis of Closed fracture of neck of left femur.  She presents with the following impairments/functional limitations: weakness, impaired endurance, impaired functional mobility, gait instability, impaired balance, decreased lower extremity function, pain, decreased ROM, edema, orthopedic precautions .    Rehab Prognosis: Good; patient would benefit from acute skilled PT services to address these deficits and reach maximum level of function.    Recent Surgery: Procedure(s) (LRB):  ROBOTIC ARTHROPLASTY,HIP (Left) 4 Days Post-Op    Plan:     During this hospitalization, patient to be seen BID to address the identified rehab impairments via gait training, therapeutic activities and progress toward the following goals:    Plan of Care Expires:  01/31/24    Subjective     Chief Complaint: n/a  Patient/Family Comments/goals: go home  Pain/Comfort:         Objective:     Communicated with rn prior to session.  Patient found up in chair with   upon PT entry to room.     General Precautions: Standard, fall  Orthopedic Precautions: LLE toe touch weight bearing, LLE posterior precautions  Braces: N/A (cam boot LLE)  Respiratory Status: Room air     Functional Mobility:  Transfers:     Sit to Stand:  stand by assistance with rolling walker  Bed to Chair: stand by assistance with  rolling walker  using  Step Transfer  Gait: pt amb 100ft w/rw while maintaining precautions on LLE.   Pt ascended/descended a 4 inch curb with supervision/touching assist using RW.     Treatment & Education:  Pt preformed seated ex 10x in chair.     Patient left up in chair with all lines intact and call button in reach..    GOALS:    Multidisciplinary Problems       Physical Therapy Goals          Problem: Physical Therapy    Goal Priority Disciplines Outcome Goal Variances Interventions   Physical Therapy Goal     PT, PT/OT Ongoing, Progressing     Description: Pt will improve functional independence by performing:    Bed mobility: SBA - met  Sit to stand: SBA with rolling walker - met  Bed to chair t/f: SBA with Stand Step  with rolling walker - met  Ambulation x 200'  with SBA with rolling walker  1 Step (Curb): Min A  with rolling walker-met  3 Steps: Min A  with B HR  Independent with total hip HEP - met                       Time Tracking:     PT Received On:    PT Start Time: 0954     PT Stop Time: 1010  PT Total Time (min): 16 min     Billable Minutes: Therapeutic Activity 16    Treatment Type: Treatment  PT/PTA: PTA     Number of PTA visits since last PT visit: 1 01/29/2024

## 2024-01-29 NOTE — NURSING
Pt left in WC with staff member. Home health notified. Some coverlets given.  at bedside states he purchased a rolling walker and a bedside commode [facility] stable, no distress, ready for discharge.

## 2024-01-29 NOTE — PLAN OF CARE
Problem: Adult Inpatient Plan of Care  Goal: Plan of Care Review  Outcome: Ongoing, Progressing  Flowsheets (Taken 1/28/2024 2131)  Plan of Care Reviewed With: patient     Problem: Fall Injury Risk  Goal: Absence of Fall and Fall-Related Injury  Outcome: Ongoing, Progressing  Intervention: Promote Injury-Free Environment  Flowsheets (Taken 1/28/2024 2131)  Safety Promotion/Fall Prevention:   assistive device/personal item within reach   lighting adjusted   medications reviewed   nonskid shoes/socks when out of bed   instructed to call staff for mobility     Problem: Pain Acute  Goal: Acceptable Pain Control and Functional Ability  Outcome: Ongoing, Progressing  Intervention: Develop Pain Management Plan  Flowsheets (Taken 1/28/2024 2131)  Pain Management Interventions:   medication offered   position adjusted   relaxation techniques promoted   quiet environment facilitated

## 2024-01-29 NOTE — PROGRESS NOTES
"Ortho Progress Note    No acute events overnight.  Pain controlled.  Resting in bed. Eating lunch comfortably.  She is doing well, pain is controlled.  Family at bedside.  No issues reported by nursing overnight    Vital Signs  Temp: 98.2 °F (36.8 °C)  Temp Source: Oral  Pulse: 86  Heart Rate Source: Monitor  Resp: 18  SpO2: 99 %  Pulse Oximetry Type: Intermittent  Flow (L/min): 2  Oxygen Concentration (%): 28  Device (Oxygen Therapy): room air  BP: 116/74  BP Location: Left arm  BP Method: Automatic  Patient Position: Lying  Arousal Level: opens eyes spontaneously  Height and Weight  Height: 5' 8" (172.7 cm)  Height Method: Stated  Weight: 65.8 kg (145 lb)  Weight Method: Bed Scale  Dosing Weight: 72.6 kg (160 lb)  BSA (Calculated - sq m): 1.78 sq meters  BMI (Calculated): 22.1  Weight in (lb) to have BMI = 25: 164.1]    +FHL/EHL  Brisk capillary refill distally  Dressing c/d/i  Sensation intact to light touch distally    Recent Lab Results       None            A/P:  Status post left STEPHON and ORIF left calcaneus fx  Overall patient doing well.  Therapy for mobility and ambulation.  lovenox for DVT Ppx  Discharge home today with home health physical therapy.  Follow up in the office in 4 weeks for repeat evaluation  "

## 2024-01-29 NOTE — PT/OT/SLP DISCHARGE
Occupational Therapy Discharge Summary    Che Kendall  MRN: [id number]   Principal Problem: Closed fracture of neck of left femur      Patient Discharged from acute Occupational Therapy on 01/29/2024.  Please refer to prior OT note dated 01/29/2024 for functional status.    Assessment:      Patient has met all goals and is not appropriate for therapy.    Objective:     GOALS:   Multidisciplinary Problems       Occupational Therapy Goals       Not on file              Multidisciplinary Problems (Resolved)          Problem: Occupational Therapy    Goal Priority Disciplines Outcome Interventions   Occupational Therapy Goal   (Resolved)     OT, PT/OT Met    Description: Pt will perform LB dressing contact guard assist by d/c. MET  Pt will perform toileting SBA by d/c. MET  Pt will perform toilet t/f SBA by d/c. MET  Pt will perform shower t/f contact guard assist by d/c. MET  Pt will perform car t/f stand by assist in adherence to pxns by d/c. MET                        Reasons for Discontinuation of Therapy Services  Satisfactory goal achievement.      Plan:     Patient Discharged to:  Home with outpatient services    1/29/2024

## 2024-01-29 NOTE — PLAN OF CARE
Problem: Physical Therapy  Goal: Physical Therapy Goal  Description: Pt will improve functional independence by performing:    Bed mobility: SBA - met  Sit to stand: SBA with rolling walker - met  Bed to chair t/f: SBA with Stand Step  with rolling walker - met  Ambulation x 200'  with SBA with rolling walker  1 Step (Curb): Min A  with rolling walker-met  3 Steps: Min A  with B HR  Independent with total hip HEP - met  Outcome: Ongoing, Progressing

## 2024-01-29 NOTE — PLAN OF CARE
Therapist report pt ambulating CGA 300FT +.     F/U with pt & , Javan @ bedside- std she would prefer to dc home w hh. Pt needs RW &  will purchase elev toilet seat. Hip kit provided.   Discuss hh option. Foc obtained.   Called referral for hh to [facility]. Info faxed.   [facility] will deliver to hospital.   Plan dc home today.

## 2024-01-30 ENCOUNTER — PATIENT OUTREACH (OUTPATIENT)
Dept: ADMINISTRATIVE | Facility: CLINIC | Age: 69
End: 2024-01-30
Payer: MEDICARE

## 2024-01-30 PROCEDURE — G0180 MD CERTIFICATION HHA PATIENT: HCPCS | Mod: ,,, | Performed by: SPECIALIST

## 2024-01-30 NOTE — PROGRESS NOTES
C3 nurse spoke with Che Kendall  for a TCC post hospital discharge follow up call. The patient has a scheduled HOSFU appointment with Dr. Constantin Selby and Dr. Richards on 02/14/2024 @ 915 am. The patient does not have a scheduled HOSFU appointment with Melina Akins FNP  within 5-7 days post hospital discharge date 01/29/2024. Unable to message PCP staff. Called Melina Akins office and spoke with Chepe regarding hospital follow up appointment 5-7 days post discharge date. Has appointment availability 02/01/2024 @ 11 am and will call patient.

## 2024-01-31 LAB — VIEW PATHOLOGY REPORT (RELIAPATH): NORMAL

## 2024-02-01 ENCOUNTER — PATIENT MESSAGE (OUTPATIENT)
Dept: ADMINISTRATIVE | Facility: OTHER | Age: 69
End: 2024-02-01
Payer: MEDICARE

## 2024-02-05 ENCOUNTER — EXTERNAL HOME HEALTH (OUTPATIENT)
Dept: HOME HEALTH SERVICES | Facility: HOSPITAL | Age: 69
End: 2024-02-05
Payer: MEDICARE

## 2024-02-14 ENCOUNTER — OFFICE VISIT (OUTPATIENT)
Dept: ORTHOPEDICS | Facility: CLINIC | Age: 69
End: 2024-02-14
Payer: MEDICARE

## 2024-02-14 ENCOUNTER — HOSPITAL ENCOUNTER (OUTPATIENT)
Dept: RADIOLOGY | Facility: CLINIC | Age: 69
Discharge: HOME | End: 2024-02-14
Attending: ORTHOPAEDIC SURGERY
Payer: MEDICARE

## 2024-02-14 VITALS
HEART RATE: 81 BPM | HEIGHT: 68 IN | DIASTOLIC BLOOD PRESSURE: 65 MMHG | BODY MASS INDEX: 21.98 KG/M2 | SYSTOLIC BLOOD PRESSURE: 106 MMHG | WEIGHT: 145 LBS

## 2024-02-14 DIAGNOSIS — S72.002D CLOSED FRACTURE OF NECK OF LEFT FEMUR WITH ROUTINE HEALING, SUBSEQUENT ENCOUNTER: ICD-10-CM

## 2024-02-14 DIAGNOSIS — S72.002D CLOSED FRACTURE OF NECK OF LEFT FEMUR WITH ROUTINE HEALING, SUBSEQUENT ENCOUNTER: Primary | ICD-10-CM

## 2024-02-14 DIAGNOSIS — S92.062D CLOSED DISPLACED INTRA-ARTICULAR FRACTURE OF LEFT CALCANEUS WITH ROUTINE HEALING, SUBSEQUENT ENCOUNTER: ICD-10-CM

## 2024-02-14 PROCEDURE — 3288F FALL RISK ASSESSMENT DOCD: CPT | Mod: CPTII,,, | Performed by: ORTHOPAEDIC SURGERY

## 2024-02-14 PROCEDURE — 1101F PT FALLS ASSESS-DOCD LE1/YR: CPT | Mod: CPTII,,, | Performed by: ORTHOPAEDIC SURGERY

## 2024-02-14 PROCEDURE — 3078F DIAST BP <80 MM HG: CPT | Mod: CPTII,,, | Performed by: ORTHOPAEDIC SURGERY

## 2024-02-14 PROCEDURE — 1160F RVW MEDS BY RX/DR IN RCRD: CPT | Mod: CPTII,,, | Performed by: ORTHOPAEDIC SURGERY

## 2024-02-14 PROCEDURE — 73502 X-RAY EXAM HIP UNI 2-3 VIEWS: CPT | Mod: LT,,, | Performed by: ORTHOPAEDIC SURGERY

## 2024-02-14 PROCEDURE — 3074F SYST BP LT 130 MM HG: CPT | Mod: CPTII,,, | Performed by: ORTHOPAEDIC SURGERY

## 2024-02-14 PROCEDURE — 99024 POSTOP FOLLOW-UP VISIT: CPT | Mod: ,,, | Performed by: ORTHOPAEDIC SURGERY

## 2024-02-14 PROCEDURE — 1159F MED LIST DOCD IN RCRD: CPT | Mod: CPTII,,, | Performed by: ORTHOPAEDIC SURGERY

## 2024-02-14 NOTE — PROGRESS NOTES
Subjective:       Patient ID: Che Kendall is a 69 y.o. female.    Chief Complaint   Patient presents with    Post-op Evaluation     ORIF left Calcaneus sx 1/24/24 gl 4/23/24, LT STEPHON sx 1/25/24 gl 4/24/24 by Dr. Richards, pt states doing okay, states pain is manageable, states incision on hip is sensitive, presents in wheelchair, has boot on left foot, has no other complaints at this time,         Patient is now 3 weeks status post open reduction and internal fixation of the left comminuted intra-articular calcaneus fracture she also underwent left total hip arthroplasty for displaced femoral neck fracture by my partner Dr. Isaias Richards.  He has seen and evaluated in the patient and provided an office note under a separate encounter.  She reports that her pain is well-controlled.  She has been compliant with her nonweightbearing status to the left lower extremity.  She is currently in a cam boot.        Review of Systems   Constitutional: Negative for chills, fever and malaise/fatigue.   HENT:  Negative for congestion and hearing loss.    Eyes:  Negative for visual disturbance.   Cardiovascular:  Negative for chest pain and syncope.   Respiratory:  Negative for cough and shortness of breath.    Hematologic/Lymphatic: Does not bruise/bleed easily.   Skin:  Negative for color change and suspicious lesions.   Musculoskeletal:  Negative for falls and neck pain.   Gastrointestinal:  Negative for abdominal pain, nausea and vomiting.   Genitourinary:  Negative for dysuria and hematuria.   Neurological:  Negative for numbness and sensory change.   Psychiatric/Behavioral:  Negative for altered mental status. The patient is not nervous/anxious.         Current Outpatient Medications on File Prior to Visit   Medication Sig Dispense Refill    aspirin 81 MG Chew Take 1 tablet (81 mg total) by mouth 2 (two) times a day. Blood clot prevention 84 tablet 0    citalopram (CELEXA) 40 MG tablet Take 40 mg by mouth once daily.       "rosuvastatin (CRESTOR) 20 MG tablet Take 20 mg by mouth every evening.      traZODone (DESYREL) 100 MG tablet Take 100 mg by mouth every evening.       No current facility-administered medications on file prior to visit.          Objective:      /65 (BP Location: Right arm, Patient Position: Sitting, BP Method: Small (Automatic))   Pulse 81   Ht 5' 8" (1.727 m)   Wt 65.8 kg (145 lb)   BMI 22.05 kg/m²   Physical Exam  Constitutional:       General: She is not in acute distress.     Appearance: Normal appearance. She is not ill-appearing.   HENT:      Head: Normocephalic and atraumatic.      Nose: No congestion.   Eyes:      Extraocular Movements: Extraocular movements intact.   Cardiovascular:      Rate and Rhythm: Normal rate and regular rhythm.      Pulses: Normal pulses.   Pulmonary:      Effort: Pulmonary effort is normal.      Breath sounds: Normal breath sounds.   Abdominal:      General: There is no distension.      Palpations: Abdomen is soft.      Tenderness: There is no abdominal tenderness.   Musculoskeletal:      Comments: Left lower extremity:  Surgical incisions are well healed.  Sutures removed today.  She has good range motion of the ankle and digits.  Dr. Richards has evaluated her left hip and her incision is clean and dry and staples have been removed.  She has no calf  tenderness or swelling, no signs of DVT   Skin:     General: Skin is warm and dry.   Neurological:      Mental Status: She is alert and oriented to person, place, and time. Mental status is at baseline.   Psychiatric:         Mood and Affect: Mood normal.         Behavior: Behavior normal.         Thought Content: Thought content normal.         Judgment: Judgment normal.        Body mass index is 22.05 kg/m².    Radiology:         Assessment:         1. Closed fracture of neck of left femur with routine healing, subsequent encounter  X-Ray Hip 2 or 3 views Left (with Pelvis when performed)      2. Closed displaced " intra-articular fracture of left calcaneus with routine healing, subsequent encounter                Plan:       I will have her maintain her nonweightbearing status to left lower extremity.  She can remove the boot perform range motion exercises.  Continue to monitor her incisions for any signs or symptoms of redness or drainage.  I would like to see her back in approximately 6 weeks for a repeat visit with myself and Dr. Richards.  We will obtain x-rays of her left hip as well as her left calcaneus.  Everything appears to be progressing well we will start a progressive weight-bearing protocol.  She understands and agrees with all that we have discussed and all questions and concerns were addressed.      This note/OR report was created with the assistance of  voice recognition software or phone  dictation.  There may be transcription errors as a result of using this technology however minimal. Effort has been made to assure accuracy of transcription but any obvious errors or omissions should be clarified with the author of the document.       Constantin Selby MD  Orthopedic Trauma  [facility]      Follow up in about 6 weeks (around 3/27/2024).    Closed fracture of neck of left femur with routine healing, subsequent encounter  -     X-Ray Hip 2 or 3 views Left (with Pelvis when performed); Future; Expected date: 02/14/2024    Closed displaced intra-articular fracture of left calcaneus with routine healing, subsequent encounter              Orders Placed This Encounter   Procedures    X-Ray Hip 2 or 3 views Left (with Pelvis when performed)     Standing Status:   Future     Number of Occurrences:   1     Standing Expiration Date:   2/12/2025     Order Specific Question:   May the Radiologist modify the order per protocol to meet the clinical needs of the patient?     Answer:   Yes     Order Specific Question:   Release to patient     Answer:   Immediate       Future Appointments   Date Time Provider  Department Center   3/25/2024  8:15 AM Constantin Selby MD [facility]

## 2024-02-20 ENCOUNTER — TELEPHONE (OUTPATIENT)
Dept: ORTHOPEDICS | Facility: CLINIC | Age: 69
End: 2024-02-20
Payer: MEDICARE

## 2024-02-20 NOTE — TELEPHONE ENCOUNTER
Dr. Richards ordered  PT for the patient but the insurance needs to do a [id number] for this. [facility] ([facility]) called stating to call back regarding this situation.     I called [facility] back regarding this and she gave the number to the [id number] line ([phone]).     I called and spoke to  Sarah and she scheduled the [id number] for 2/21/24 @ 8:45 between Dr. Richards and Dr. Cassia Zuniga.

## 2024-02-27 ENCOUNTER — TELEPHONE (OUTPATIENT)
Dept: ORTHOPEDICS | Facility: CLINIC | Age: 69
End: 2024-02-27
Payer: MEDICARE

## 2024-02-27 NOTE — TELEPHONE ENCOUNTER
Returned patient's call, she has small area around heel that scab has come off, it has what sounds like granulation tissue under scab.  Concerned that it may be infection.  Denies any redness, foul smell, or pus to wound.  Instructed to keep clean, dry.  She will continue to monitor, if not improving by weekend, we will get her in on Tuesday to see Dr. Selby.  Verbalized understanding.

## 2024-03-25 ENCOUNTER — HOSPITAL ENCOUNTER (OUTPATIENT)
Dept: RADIOLOGY | Facility: CLINIC | Age: 69
Discharge: HOME | End: 2024-03-25
Attending: ORTHOPAEDIC SURGERY
Payer: MEDICARE

## 2024-03-25 ENCOUNTER — OFFICE VISIT (OUTPATIENT)
Dept: ORTHOPEDICS | Facility: CLINIC | Age: 69
End: 2024-03-25
Payer: MEDICARE

## 2024-03-25 VITALS
DIASTOLIC BLOOD PRESSURE: 72 MMHG | SYSTOLIC BLOOD PRESSURE: 116 MMHG | WEIGHT: 131 LBS | BODY MASS INDEX: 19.92 KG/M2 | HEART RATE: 93 BPM

## 2024-03-25 DIAGNOSIS — S72.002D CLOSED FRACTURE OF NECK OF LEFT FEMUR WITH ROUTINE HEALING, SUBSEQUENT ENCOUNTER: ICD-10-CM

## 2024-03-25 DIAGNOSIS — S92.002D CLOSED DISPLACED FRACTURE OF LEFT CALCANEUS WITH ROUTINE HEALING, UNSPECIFIED PORTION OF CALCANEUS, SUBSEQUENT ENCOUNTER: ICD-10-CM

## 2024-03-25 DIAGNOSIS — S92.002D CLOSED DISPLACED FRACTURE OF LEFT CALCANEUS WITH ROUTINE HEALING, UNSPECIFIED PORTION OF CALCANEUS, SUBSEQUENT ENCOUNTER: Primary | ICD-10-CM

## 2024-03-25 PROCEDURE — 73650 X-RAY EXAM OF HEEL: CPT | Mod: LT,,, | Performed by: ORTHOPAEDIC SURGERY

## 2024-03-25 PROCEDURE — 1159F MED LIST DOCD IN RCRD: CPT | Mod: CPTII,,, | Performed by: PHYSICIAN ASSISTANT

## 2024-03-25 PROCEDURE — 3074F SYST BP LT 130 MM HG: CPT | Mod: CPTII,,, | Performed by: PHYSICIAN ASSISTANT

## 2024-03-25 PROCEDURE — 99024 POSTOP FOLLOW-UP VISIT: CPT | Mod: ,,, | Performed by: PHYSICIAN ASSISTANT

## 2024-03-25 PROCEDURE — 73502 X-RAY EXAM HIP UNI 2-3 VIEWS: CPT | Mod: LT,,, | Performed by: ORTHOPAEDIC SURGERY

## 2024-03-25 PROCEDURE — 3078F DIAST BP <80 MM HG: CPT | Mod: CPTII,,, | Performed by: PHYSICIAN ASSISTANT

## 2024-03-25 NOTE — PROGRESS NOTES
Subjective:       Patient ID: Che Kendall is a 69 y.o. female.  Chief Complaint   Patient presents with    Left Heel - Post-op Evaluation     8 1/2 weeks post ORIF left calcaneous and left STEPHON. Presents in wheelchair, non-weightbearing.     Pelvis - Post-op Evaluation        HPI    Patient presents for 8.5 week follow up ORIF left calcaneus and Left total hip with Dr. Richards. Doing well overall. Eager to begin weight bearing. Sore to posterior heel has healed well, has minimal scabbing present. She does have a CAM boot. Endorses no hip pain, eager to begin progressive weight bearing. No numbness or tingling distally.     ROS:  Constitutional: Denies fever chills  Eyes: No change in vision  ENT: No ringing or current infections  CV: No chest pain  Resp: No labored breathing  MSK: Pain evident at site of injury located in HPI,   Integ: No signs of abrasions or lacerations  Neuro: No numbness or tingling  Lymphatic: No swelling outside the area of injury     Current Outpatient Medications on File Prior to Visit   Medication Sig Dispense Refill    citalopram (CELEXA) 40 MG tablet Take 40 mg by mouth once daily.      rosuvastatin (CRESTOR) 20 MG tablet Take 20 mg by mouth every evening.      traZODone (DESYREL) 100 MG tablet Take 100 mg by mouth every evening.      aspirin 81 MG Chew Take 1 tablet (81 mg total) by mouth 2 (two) times a day. Blood clot prevention 84 tablet 0     No current facility-administered medications on file prior to visit.          Objective:      /72 (BP Location: Left arm, Patient Position: Sitting, BP Method: Small (Automatic))   Pulse 93   Wt 59.4 kg (131 lb)   BMI 19.92 kg/m²   Physical Exam  General the patient is alert and oriented x3 no acute distress nontoxic-appearing appropriate affect.    Constitutional: Vital signs are examined and stable.  Resp: No signs of labored breathing               LLE: -Skin: incisions healing well, no open sores, No signs of new abrasions or  "lacerations, no scars           -MSK: Hip and Knee F/E, EHL/FHL, Gastroc/Tib anterior Strength 5/5;           -Neuro:  Sensation intact to light touch L3-S1 dermatomes           -Lymphatic: No signs of lymphadenopathy           -CV: Capillary refill is less than 2 seconds. DP/PT pulses 2/4. Compartments soft and compressible                            Body mass index is 19.92 kg/m².  Ideal body weight: 63.9 kg (140 lb 14 oz)  No results found for: "HGBA1C"  Hgb   Date Value Ref Range Status   01/27/2024 9.6 (L) 12.0 - 16.0 g/dL Final   01/26/2024 9.3 (L) 12.0 - 16.0 g/dL Final     Hemoglobin   Date Value Ref Range Status   03/20/2024 12.8 11.1 - 15.9 g/dL Final   03/02/2023 14.9 11.1 - 15.9 g/dL Final     Hematocrit   Date Value Ref Range Status   03/20/2024 40.0 34.0 - 46.6 % Final   03/02/2023 46.2 34.0 - 46.6 % Final     Hct   Date Value Ref Range Status   01/27/2024 28.4 (L) 37.0 - 47.0 % Final   01/26/2024 27.7 (L) 37.0 - 47.0 % Final     No results found for: "IRON"  No components found for: "FROLATE"  Vit D 25 OH   Date Value Ref Range Status   02/14/2019 22.23 (L) 30.00 - 80.00 ng/mL Final     WBC   Date Value Ref Range Status   01/27/2024 10.78 4.50 - 11.50 x10(3)/mcL Final   01/26/2024 12.18 (H) 4.50 - 11.50 x10(3)/mcL Final     White Blood Cell Count   Date Value Ref Range Status   03/20/2024 6.3 3.4 - 10.8 x10E3/uL Final   03/02/2023 5.0 3.4 - 10.8 x10E3/uL Final       Radiology:   3 view x ray left calcaneus: hardware intact without loosening or failure. Continued consolidation consistent with routine healing. Alignment is well maintained.     3 view x ray left hip: implant in place, length and alignment stable without loosening.         Assessment:         1. Closed displaced fracture of left calcaneus with routine healing, unspecified portion of calcaneus, subsequent encounter  X-Ray Calcaneus 2 View Left    Ambulatory referral/consult to Physical/Occupational Therapy      2. Closed fracture of neck " of left femur with routine healing, subsequent encounter  X-Ray Hip 2 or 3 views Left (with Pelvis when performed)              Plan:         Follow up in about 6 weeks (around 5/6/2024), or if symptoms worsen or fail to improve.    Che was seen today for post-op evaluation and post-op evaluation.    Diagnoses and all orders for this visit:    Closed displaced fracture of left calcaneus with routine healing, unspecified portion of calcaneus, subsequent encounter  -     X-Ray Calcaneus 2 View Left; Future  -     Ambulatory referral/consult to Physical/Occupational Therapy; Future    Closed fracture of neck of left femur with routine healing, subsequent encounter  -     X-Ray Hip 2 or 3 views Left (with Pelvis when performed); Future        -Begin progressive WB protocol LLE in CAM boot x 4 weeks then transition to regular shoe. Hip x rays stable today. Dr. Richards and Fan Walls PA-C available to see patient simultaneously during her visit today. She will be seen by them at the same time at the next appointment as well. Incisions are well healed. No complications to date. Therapy order provided today.   -Follow up in 6 weeks for repeat x rays and evaluation or sooner as needed.   -ED precautions given    The above findings, diagnostics, and treatment plan were discussed with Dr. Casarez who is in agreement with the plan of care except as stated in additional documentation.       Claribel Padgett PA-C          Future Appointments   Date Time Provider Department Center   5/6/2024  8:15 AM Constantin Selby MD [facility]

## 2024-04-09 DIAGNOSIS — Z96.642 S/P TOTAL LEFT HIP ARTHROPLASTY: Primary | ICD-10-CM

## 2024-04-11 DIAGNOSIS — Z12.31 VISIT FOR SCREENING MAMMOGRAM: Primary | ICD-10-CM

## 2024-04-11 DIAGNOSIS — Z13.820 SCREENING FOR OSTEOPOROSIS: ICD-10-CM

## 2024-04-11 DIAGNOSIS — Z78.0 POSTMENOPAUSAL: ICD-10-CM

## 2024-04-18 ENCOUNTER — HOSPITAL ENCOUNTER (OUTPATIENT)
Dept: RADIOLOGY | Facility: HOSPITAL | Age: 69
Discharge: HOME | End: 2024-04-18
Attending: NURSE PRACTITIONER
Payer: MEDICARE

## 2024-04-18 DIAGNOSIS — Z12.31 VISIT FOR SCREENING MAMMOGRAM: ICD-10-CM

## 2024-04-18 DIAGNOSIS — Z13.820 SCREENING FOR OSTEOPOROSIS: ICD-10-CM

## 2024-04-18 DIAGNOSIS — S72.002A CLOSED FRACTURE OF NECK OF LEFT FEMUR: ICD-10-CM

## 2024-04-18 DIAGNOSIS — Z78.0 POSTMENOPAUSAL: ICD-10-CM

## 2024-04-18 PROCEDURE — 77067 SCR MAMMO BI INCL CAD: CPT | Mod: TC

## 2024-04-18 PROCEDURE — 77067 SCR MAMMO BI INCL CAD: CPT | Mod: 26,,, | Performed by: RADIOLOGY

## 2024-04-18 PROCEDURE — 77081 DXA BONE DENSITY APPENDICULR: CPT | Mod: TC,59

## 2024-04-18 PROCEDURE — 77063 BREAST TOMOSYNTHESIS BI: CPT | Mod: 26,,, | Performed by: RADIOLOGY

## 2024-04-18 PROCEDURE — 77080 DXA BONE DENSITY AXIAL: CPT | Mod: TC

## 2024-05-06 ENCOUNTER — OFFICE VISIT (OUTPATIENT)
Dept: ORTHOPEDICS | Facility: CLINIC | Age: 69
End: 2024-05-06
Payer: MEDICARE

## 2024-05-06 ENCOUNTER — HOSPITAL ENCOUNTER (OUTPATIENT)
Dept: RADIOLOGY | Facility: CLINIC | Age: 69
Discharge: HOME | End: 2024-05-06
Attending: ORTHOPAEDIC SURGERY
Payer: MEDICARE

## 2024-05-06 VITALS
HEART RATE: 102 BPM | HEIGHT: 68 IN | SYSTOLIC BLOOD PRESSURE: 119 MMHG | BODY MASS INDEX: 19.84 KG/M2 | DIASTOLIC BLOOD PRESSURE: 75 MMHG | WEIGHT: 130.94 LBS

## 2024-05-06 DIAGNOSIS — S92.002D CLOSED DISPLACED FRACTURE OF LEFT CALCANEUS WITH ROUTINE HEALING, UNSPECIFIED PORTION OF CALCANEUS, SUBSEQUENT ENCOUNTER: Primary | ICD-10-CM

## 2024-05-06 DIAGNOSIS — Z96.642 S/P TOTAL LEFT HIP ARTHROPLASTY: ICD-10-CM

## 2024-05-06 DIAGNOSIS — S92.002D CLOSED DISPLACED FRACTURE OF LEFT CALCANEUS WITH ROUTINE HEALING, UNSPECIFIED PORTION OF CALCANEUS, SUBSEQUENT ENCOUNTER: ICD-10-CM

## 2024-05-06 PROCEDURE — 3288F FALL RISK ASSESSMENT DOCD: CPT | Mod: CPTII,,,

## 2024-05-06 PROCEDURE — 1160F RVW MEDS BY RX/DR IN RCRD: CPT | Mod: CPTII,,,

## 2024-05-06 PROCEDURE — 73650 X-RAY EXAM OF HEEL: CPT | Mod: LT,,, | Performed by: ORTHOPAEDIC SURGERY

## 2024-05-06 PROCEDURE — 99213 OFFICE O/P EST LOW 20 MIN: CPT | Mod: ,,,

## 2024-05-06 PROCEDURE — 3078F DIAST BP <80 MM HG: CPT | Mod: CPTII,,,

## 2024-05-06 PROCEDURE — 1101F PT FALLS ASSESS-DOCD LE1/YR: CPT | Mod: CPTII,,,

## 2024-05-06 PROCEDURE — 1159F MED LIST DOCD IN RCRD: CPT | Mod: CPTII,,,

## 2024-05-06 PROCEDURE — 3074F SYST BP LT 130 MM HG: CPT | Mod: CPTII,,,

## 2024-05-06 PROCEDURE — 3008F BODY MASS INDEX DOCD: CPT | Mod: CPTII,,,

## 2024-05-06 NOTE — PROGRESS NOTES
Chief Complaint:   Chief Complaint   Patient presents with    Follow-up     3.5 mos, ORIF left Calcaneus sx 24, LT STEPHON sx 24 by Dr. Richards, still has pain but manageable, wbat, ambulating with walker, has no other complaints at this time,        History of present illness:    69-year-old female is here for her follow-up for her ORIF of her left calcaneus fracture with Dr. Selby.  She also underwent a robotic assisted left total hip arthroplasty on  for displaced femoral neck fracture.  I am being asked to see her for her hip today while she is here for her follow-up on her calcaneus fracture.  Overall she is doing well.  She is progressing very well in regards to her calcaneus as well as her hip.  She is occasional soreness and spasms of the left hip.  She has no groin pain.  She has been partial weight-bearing with her walker.  She has been attending physical therapy    Past Medical History:   Diagnosis Date    Anxiety disorder, unspecified     Depression     Femur fracture, left     Fracture, foot, left     Hypercholesteremia     PONV (postoperative nausea and vomiting)        Past Surgical History:   Procedure Laterality Date    APPENDECTOMY      AUGMENTATION OF BREAST Bilateral     CARDIAC CATHETERIZATION      WNL     SECTION      FRACTURE SURGERY  2024    JOINT REPLACEMENT  2024    Hip    OOPHORECTOMY Right     age 15    OPEN REDUCTION AND INTERNAL FIXATION (ORIF) OF FRACTURE OF CALCANEUS Left 2024    Procedure: ORIF, FRACTURE, CALCANEUS;  Surgeon: Constantin Selby MD;  Location[place] [facility];  Service: Orthopedics;  Laterality: Left;  Lateral, vascular bed, moon foam, tourniquet  4th case  synthes large [facility] and [facility] calc plates    right meniscus repair      ROBOTIC ARTHROPLASTY, HIP Left 2024    Procedure: ROBOTIC ARTHROPLASTY,HIP;  Surgeon: Isaias Richards MD;  Location: [facility] OR;  Service: Orthopedics;  Laterality: Left;  Kostas   Insirish       Current  Outpatient Medications   Medication Sig Dispense Refill    citalopram (CELEXA) 40 MG tablet Take 40 mg by mouth once daily.      rosuvastatin (CRESTOR) 20 MG tablet Take 20 mg by mouth every evening.      traZODone (DESYREL) 100 MG tablet Take 100 mg by mouth every evening.      aspirin 81 MG Chew Take 1 tablet (81 mg total) by mouth 2 (two) times a day. Blood clot prevention 84 tablet 0     No current facility-administered medications for this visit.       Review of patient's allergies indicates:  No Known Allergies    No family history on file.    Social History     Socioeconomic History    Marital status:    Tobacco Use    Smoking status: Some Days     Current packs/day: 1.00     Average packs/day: 1 pack/day for 15.0 years (15.0 ttl pk-yrs)     Types: Cigarettes    Smokeless tobacco: Never   Substance and Sexual Activity    Alcohol use: Not Currently    Drug use: Never    Sexual activity: Not Currently     Partners: Male     Social Determinants of Health     Financial Resource Strain: Medium Risk (3/14/2023)    Received from [facility] Subsidiaries and Affiliates, [facility]    Overall Financial Resource Strain (CARDIA)     Difficulty of Paying Living Expenses: Somewhat hard   Food Insecurity: Food Insecurity Present (3/14/2023)    Received from [facility] Its Subsidiaries and Affiliates, [facility] and Its Subsidiaries and Affiliates    Hunger Vital Sign     Worried About Running Out of Food in the Last Year: Sometimes true     Ran Out of Food in the Last Year: Sometimes true   Transportation Needs: No Transportation Needs (3/14/2023)    Received from [facility] Subsidiaries and Affiliates, [facility]  [facility]    PRAPARE - Transportation     Lack of Transportation (Medical): No     Lack of Transportation (Non-Medical): No   Physical Activity: Insufficiently Active (3/14/2023)    Received from [facility], [facility]    Exercise Vital Sign     Days of Exercise per Week: 2 days     Minutes of Exercise per Session: 20 min   Stress: Stress Concern Present (3/14/2023)    Received from [facility] and Affiliates, [facility] and Aff[facility]ates    [employer] - Occupational Stress Questionnaire     Feeling of Stress : To some extent   Housing Stability: Low Risk  (3/14/2023)    Received from [facility] and [facility]ates, [facility] and [facility]    Housing Stability Vital Sign     Unable to Pay for Housing in the Last Year: No     Number of Places Lived in the Last Year: 1     In the last 12 months, was there a time when you did not have a steady place to sleep or slept in a shelter (including now)?: No         Review of Systems:    Constitution:   Denies chills, fever, and sweats.  HENT:   Denies headaches or blurry vision.  Cardiovascular:  Denies chest pain or irregular heart beat.  Respiratory:   Denies cough or shortness of breath.  Gastrointestinal:  Denies abdominal pain, nausea, or vomiting.  Musculoskeletal:   Denies muscle cramps.  Neurological:   Denies dizziness or focal weakness.  Psychiatric/Behavior: Normal mental status.  Hematology/Lymph:  Denies bleeding problem or easy bruising/bleeding.  Skin:    Denies rash or suspicious lesions.    Examination:    Vital Signs:    Vitals:     "05/06/24 0809   BP: 119/75   Pulse: 102   Weight: 59.4 kg (130 lb 15.3 oz)   Height: 5' 8" (1.727 m)       Body mass index is 19.91 kg/m².    Constitution:   Well-developed, well nourished patient in no acute distress.  Neurological:   Alert and oriented x 3 and cooperative to examination.     Psychiatric/Behavior: Normal mental status.  Respiratory:   No shortness of breath.  Nonlabored breathing  Cardiovascular:           Regular rate and rhythm  Eyes:    Extraoccular muscles intact  Skin:    No scars, rash or suspicious lesions.    Physical Exam:     Left Hip     No swelling,erythema    Well healed scar    No tenderness or instability of the hips was noted. Motion was normal. No weakness observed.        Assessment:     Closed displaced fracture of left calcaneus with routine healing, unspecified portion of calcaneus, subsequent encounter  -     X-Ray Calcaneus 2 View Left; Future; Expected date: 05/06/2024    S/P total left hip arthroplasty        Plan:      She will continue with her physical therapy with incorporated gluteal and quad such stretches and strengthening exercises.  She will advance her weight-bearing per Dr. Selby protocol.  We will see her back in 9 months' time for her yearly checkup for her left hip with left hip radiographs needed at that time        No follow-ups on file.    DISCLAIMER: This note may have been dictated using voice recognition software and may contain grammatical errors.     "

## 2024-05-06 NOTE — PROGRESS NOTES
"[facility]      Name: Che Kendall  : 1955  MRN: [id number]  Date: 2024    Chief Complaint   Patient presents with    Follow-up     3.5 mos, ORIF left Calcaneus sx 24, LT STEPHON sx 24 by Dr. Richards, still has pain but manageable, wbat, ambulating with walker, has no other complaints at this time,        Subjective:       Patient ID: Che Kendall is a 69 y.o. female.    Chief Complaint   Patient presents with    Follow-up     3.5 mos, ORIF left Calcaneus sx 24, LT STEPHON sx 24 by Dr. Richards, still has pain but manageable, wbat, ambulating with walker, has no other complaints at this time,         HPI  69-year-old female status post open reduction internal fixation of left calcaneus presents for 3.5 month follow up.  She presents in normal shoes today.  Is weight-bearing as tolerated.  Ambulating with a walker.  Has no complaints at this time.  She also had a left total hip arthroplasty performed 3.5 months ago by Dr. Richards.  At her last visit, she was seen by both services.      ROS:  Constitutional: Denies fever chills  MSK: Pain evident at site of injury located in HPI,   Integ: No signs of abrasions or lacerations  Neuro: No numbness or tingling  Lymphatic: No swelling outside the area of injury     Current Outpatient Medications   Medication Instructions    aspirin 81 mg, Oral, 2 times daily, Blood clot prevention    citalopram (CELEXA) 40 mg, Oral, Daily    rosuvastatin (CRESTOR) 20 mg, Oral, Nightly    traZODone (DESYREL) 100 mg, Oral, Nightly          Objective:      Visit Vitals  /75   Pulse 102   Ht 5' 8" (1.727 m)   Wt 59.4 kg (130 lb 15.3 oz)   BMI 19.91 kg/m²     Physical Exam    General the patient is alert and oriented x3 no acute distress nontoxic-appearing appropriate affect.    Constitutional: Vital signs are examined and stable.  Resp: No signs of labored breathing    LLE: -Skin:  Incisions well healed           -MSK: Hip and Knee " F/E, EHL/FHL, Gastroc/Tib anterior Strength 5/5.  No painful or prominent hardware.           -Neuro:  Sensation intact to light touch L3-S1 dermatomes           -Lymphatic: No signs of lymphadenopathy           -CV: Capillary refill is less than 2 seconds. DP/PT pulses 2+. Compartments soft and compressible                          Body mass index is 19.91 kg/m².  Ideal body weight: 63.9 kg (140 lb 14 oz)  Hgb   Date Value Ref Range Status   01/27/2024 9.6 (L) 12.0 - 16.0 g/dL Final   01/26/2024 9.3 (L) 12.0 - 16.0 g/dL Final     Hemoglobin   Date Value Ref Range Status   03/02/2023 14.9 11.1 - 15.9 g/dL Final   03/09/2022 13.8 11.1 - 15.9 g/dL Final     Hematocrit   Date Value Ref Range Status   03/02/2023 46.2 34.0 - 46.6 % Final   03/09/2022 41.9 34.0 - 46.6 % Final     Hct   Date Value Ref Range Status   01/27/2024 28.4 (L) 37.0 - 47.0 % Final   01/26/2024 27.7 (L) 37.0 - 47.0 % Final     Vit D 25 OH   Date Value Ref Range Status   02/14/2019 22.23 (L) 30.00 - 80.00 ng/mL Final     WBC   Date Value Ref Range Status   01/27/2024 10.78 4.50 - 11.50 x10(3)/mcL Final   01/26/2024 12.18 (H) 4.50 - 11.50 x10(3)/mcL Final     White Blood Cell Count   Date Value Ref Range Status   03/02/2023 5.0 3.4 - 10.8 x10E3/uL Final   03/09/2022 6.0 3.4 - 10.8 x10E3/uL Final       Radiology:   Left calcaneus x-ray demonstrates intact hardware without evidence of failure.  Fracture appears to have healed.        Assessment:           ICD-10-CM ICD-9-CM   1. Closed displaced fracture of left calcaneus with routine healing, unspecified portion of calcaneus, subsequent encounter  S92.002D V54.19   2. S/P total left hip arthroplasty  Z96.642 V43.64           Plan:         No follow-ups on file.    1. Closed displaced fracture of left calcaneus with routine healing, unspecified portion of calcaneus, subsequent encounter  -     X-Ray Calcaneus 2 View Left; Future; Expected date: 05/06/2024    2. S/P total left hip  arthroplasty      Patient is seen by myself and Fan ARREGUIN today.  We discussed she will need to follow up with him again at the 1 year hannah from surgery approximately 9 months from now.    Patient to follow up with the trauma service alone in 2-3 months.  She may continue weight-bearing as tolerated.  Range of motion as tolerated.  May continue wearing normal shoes.  Pt verbalizes understanding and agrees with tx plan. Pt to call clinic with any questions/concerns.      The above findings, diagnostics, and treatment plan were discussed with Dr. Selby who is in agreement with the plan of care except as stated in additional documentation.     Ashley Gunther, PA-C [facility]        Future Appointments   Date Time Provider Department Center   8/6/2024  8:15 AM Constantin Selby MD [facility] [place]

## 2024-08-06 ENCOUNTER — OFFICE VISIT (OUTPATIENT)
Dept: ORTHOPEDICS | Facility: CLINIC | Age: 69
End: 2024-08-06
Payer: MEDICARE

## 2024-08-06 ENCOUNTER — HOSPITAL ENCOUNTER (OUTPATIENT)
Dept: RADIOLOGY | Facility: CLINIC | Age: 69
Discharge: HOME | End: 2024-08-06
Attending: ORTHOPAEDIC SURGERY
Payer: MEDICARE

## 2024-08-06 VITALS
BODY MASS INDEX: 19.84 KG/M2 | HEART RATE: 81 BPM | SYSTOLIC BLOOD PRESSURE: 138 MMHG | WEIGHT: 130.94 LBS | HEIGHT: 68 IN | DIASTOLIC BLOOD PRESSURE: 81 MMHG

## 2024-08-06 DIAGNOSIS — S92.002D CLOSED DISPLACED FRACTURE OF LEFT CALCANEUS WITH ROUTINE HEALING, UNSPECIFIED PORTION OF CALCANEUS, SUBSEQUENT ENCOUNTER: Primary | ICD-10-CM

## 2024-08-06 DIAGNOSIS — S92.002D CLOSED DISPLACED FRACTURE OF LEFT CALCANEUS WITH ROUTINE HEALING, UNSPECIFIED PORTION OF CALCANEUS, SUBSEQUENT ENCOUNTER: ICD-10-CM

## 2024-08-06 PROCEDURE — 1160F RVW MEDS BY RX/DR IN RCRD: CPT | Mod: CPTII,,, | Performed by: ORTHOPAEDIC SURGERY

## 2024-08-06 PROCEDURE — 1101F PT FALLS ASSESS-DOCD LE1/YR: CPT | Mod: CPTII,,, | Performed by: ORTHOPAEDIC SURGERY

## 2024-08-06 PROCEDURE — 73650 X-RAY EXAM OF HEEL: CPT | Mod: LT,,, | Performed by: ORTHOPAEDIC SURGERY

## 2024-08-06 PROCEDURE — 3008F BODY MASS INDEX DOCD: CPT | Mod: CPTII,,, | Performed by: ORTHOPAEDIC SURGERY

## 2024-08-06 PROCEDURE — 1159F MED LIST DOCD IN RCRD: CPT | Mod: CPTII,,, | Performed by: ORTHOPAEDIC SURGERY

## 2024-08-06 PROCEDURE — 3079F DIAST BP 80-89 MM HG: CPT | Mod: CPTII,,, | Performed by: ORTHOPAEDIC SURGERY

## 2024-08-06 PROCEDURE — 3288F FALL RISK ASSESSMENT DOCD: CPT | Mod: CPTII,,, | Performed by: ORTHOPAEDIC SURGERY

## 2024-08-06 PROCEDURE — 99213 OFFICE O/P EST LOW 20 MIN: CPT | Mod: ,,, | Performed by: ORTHOPAEDIC SURGERY

## 2024-08-06 PROCEDURE — 3075F SYST BP GE 130 - 139MM HG: CPT | Mod: CPTII,,, | Performed by: ORTHOPAEDIC SURGERY

## 2024-10-15 DIAGNOSIS — Z87.891 PERSONAL HISTORY OF TOBACCO USE, PRESENTING HAZARDS TO HEALTH: Primary | ICD-10-CM

## 2025-02-11 ENCOUNTER — HOSPITAL ENCOUNTER (OUTPATIENT)
Dept: RADIOLOGY | Facility: HOSPITAL | Age: 70
Discharge: HOME | End: 2025-02-11
Attending: OTOLARYNGOLOGY
Payer: MEDICARE

## 2025-02-11 DIAGNOSIS — F17.200 NICOTINE ADDICTION: ICD-10-CM

## 2025-02-11 DIAGNOSIS — F17.201 TOBACCO ABUSE, IN REMISSION: Primary | ICD-10-CM

## 2025-02-11 DIAGNOSIS — H02.839 DERMATOCHALASIS: ICD-10-CM

## 2025-02-11 DIAGNOSIS — Z79.01 LONG TERM (CURRENT) USE OF ANTICOAGULANTS: ICD-10-CM

## 2025-02-11 DIAGNOSIS — Z01.818 OTHER SPECIFIED PRE-OPERATIVE EXAMINATION: ICD-10-CM

## 2025-02-11 DIAGNOSIS — Z72.0 TOBACCO ABUSE: ICD-10-CM

## 2025-02-11 DIAGNOSIS — F17.200 TOBACCO USE DISORDER: Primary | ICD-10-CM

## 2025-02-11 PROCEDURE — 71046 X-RAY EXAM CHEST 2 VIEWS: CPT | Mod: TC

## 2025-02-13 ENCOUNTER — LAB VISIT (OUTPATIENT)
Dept: LAB | Facility: HOSPITAL | Age: 70
End: 2025-02-13
Attending: OTOLARYNGOLOGY
Payer: MEDICARE

## 2025-02-13 DIAGNOSIS — F17.201 TOBACCO ABUSE, IN REMISSION: ICD-10-CM

## 2025-02-13 PROCEDURE — 36415 COLL VENOUS BLD VENIPUNCTURE: CPT

## 2025-02-13 PROCEDURE — 80323 ALKALOIDS NOS: CPT

## 2025-02-15 LAB
COTININE SERPL-MCNC: 194 NG/ML
NICOTINE SERPL-MCNC: <3 NG/ML

## 2025-04-15 DIAGNOSIS — E78.2 MIXED HYPERLIPIDEMIA: Primary | ICD-10-CM

## 2025-04-15 DIAGNOSIS — I25.10 ATHEROSCLEROSIS OF NATIVE CORONARY ARTERY OF NATIVE HEART WITHOUT ANGINA PECTORIS: ICD-10-CM

## 2025-04-22 ENCOUNTER — HOSPITAL ENCOUNTER (OUTPATIENT)
Dept: RADIOLOGY | Facility: HOSPITAL | Age: 70
Discharge: HOME | End: 2025-04-22
Attending: NURSE PRACTITIONER
Payer: MEDICARE

## 2025-04-22 DIAGNOSIS — I25.10 ATHEROSCLEROSIS OF NATIVE CORONARY ARTERY OF NATIVE HEART WITHOUT ANGINA PECTORIS: ICD-10-CM

## 2025-04-22 DIAGNOSIS — E78.2 MIXED HYPERLIPIDEMIA: ICD-10-CM

## 2025-04-22 PROCEDURE — 93880 EXTRACRANIAL BILAT STUDY: CPT | Mod: TC

## (undated) DEVICE — DRESSING TRANS 4X4 3/4

## (undated) DEVICE — DRESSING XEROFORM FOIL PK 1X8

## (undated) DEVICE — DRAPE FULL SHEET 70X100IN

## (undated) DEVICE — PIN BONE 4 X 140MM STERILE
Type: IMPLANTABLE DEVICE | Site: HIP | Status: NON-FUNCTIONAL
Removed: 2024-01-25

## (undated) DEVICE — KIT C.A.T.S. FAST START

## (undated) DEVICE — Device

## (undated) DEVICE — GLOVE PROTEXIS BLUE LATEX 7

## (undated) DEVICE — COVER FULLGUARD SHOE HIGH-TOP

## (undated) DEVICE — SPONGE GAUZE 4X4 12 PLY STRL

## (undated) DEVICE — GOWN SMARTSLEEVE AAMI LVL4 XL

## (undated) DEVICE — SPONGE LAP 18X18 PREWASHED

## (undated) DEVICE — PAD ABDOMINAL 8X7.5 STERILE

## (undated) DEVICE — BIT DRILL CANNULATED 3MM

## (undated) DEVICE — SUT VICRYL 1 OB 36 CTX

## (undated) DEVICE — GEL SURGX ANTIMICROBIAL 7.5ML

## (undated) DEVICE — GLOVE PROTEXIS PI CRM 7

## (undated) DEVICE — GLOVE SENSICARE PI MICRO 8

## (undated) DEVICE — PADDING 4X4YD SPECIALIST100

## (undated) DEVICE — PAD ABD 8X10 STERILE

## (undated) DEVICE — PAD CAST 6X4YD SPECIALISTIC

## (undated) DEVICE — SPONGE GAUZE 16PLY 4X4

## (undated) DEVICE — DRAPE ORTH SPLIT 77X108IN

## (undated) DEVICE — SUT ETHILON 3-0 FS-1 30

## (undated) DEVICE — DRESSING AQUACEL AG ADV 3.5X12

## (undated) DEVICE — KIT TOTAL HIP HLGC

## (undated) DEVICE — TAPE SILK 3IN

## (undated) DEVICE — GUIDEWIRE ORTHOPEDIC 220X1.6MM
Type: IMPLANTABLE DEVICE | Site: LEG | Status: NON-FUNCTIONAL
Removed: 2024-01-24

## (undated) DEVICE — ELECTRODE PATIENT RETURN DISP

## (undated) DEVICE — GUIDEWIRE TROCAR TIP 2.8X220MM
Type: IMPLANTABLE DEVICE | Site: LEG | Status: NON-FUNCTIONAL
Removed: 2024-01-24

## (undated) DEVICE — SUT 1 36IN PDS II VIO MONO

## (undated) DEVICE — GLOVE 8 PROTEXIS PI ORTHO

## (undated) DEVICE — SOL NACL IRR 1000ML BTL

## (undated) DEVICE — DRAPE STERI U-SHAPED 47X51IN

## (undated) DEVICE — DRAPE MEDIUM SHEET 40X70IN

## (undated) DEVICE — SPONGE LAP STRL 18X18IN

## (undated) DEVICE — HOOD FLYTE SURGICOOL

## (undated) DEVICE — SUT MCRYL PLUS 4-0 PS2 27IN

## (undated) DEVICE — KIT DRAPE RIO ONE PIECE W/POCK

## (undated) DEVICE — GLOVE PROTEXIS LTX MICRO 8

## (undated) DEVICE — SUT VICRYL+ 27 UR-6 VIOL

## (undated) DEVICE — KIT SURGICAL TURNOVER

## (undated) DEVICE — SUT VICRYL 2-0 36 CT-1

## (undated) DEVICE — DEVICE PLASMABLADE X 3.0S LT

## (undated) DEVICE — COUNTER SHARPS DEVON 2 MAGNET

## (undated) DEVICE — SOL POVIDONE IODINE PCH 3/4OZ

## (undated) DEVICE — DRAPE C-ARMOR EQUIPMENT COVER

## (undated) DEVICE — KIT DRAIN WOUND RND SPRNG RESV

## (undated) DEVICE — TAPE ADH MEDIPORE 4 X 10YDS

## (undated) DEVICE — RETRIEVER SUTURE HEWSON DISP

## (undated) DEVICE — SOL NACL IRR 3000ML

## (undated) DEVICE — COVER HD BACK TABLE 6FT

## (undated) DEVICE — KIT CHECKPOINT TIBIAL

## (undated) DEVICE — ELECTRODE REM POLYHESIVE II

## (undated) DEVICE — PADDING WYTEX UNDRCST 2INX4YD

## (undated) DEVICE — TAPE POROUS 3 IN 10 YD WHITE

## (undated) DEVICE — BANDAGE ESMARK 6INX3YD

## (undated) DEVICE — APPLICATOR CHLORAPREP ORN 26ML

## (undated) DEVICE — BLADE SAW SAG 22.13MM 0.92MM

## (undated) DEVICE — WALKER TRACKER EX LARGE

## (undated) DEVICE — TOWEL OR BLUE STRL 16X26 4/PK

## (undated) DEVICE — PILLOW ABDUCTION FOAM MED

## (undated) DEVICE — GAUZE SPONGE 4X4 12PLY

## (undated) DEVICE — COVER TABLE HVY DTY 60X90IN

## (undated) DEVICE — KIT TRACKING VIZADISC HIP

## (undated) DEVICE — SYR 30CC LUER LOCK

## (undated) DEVICE — SUT PDS PLUS 1 TP1 96IN

## (undated) DEVICE — BLADE SURG STAINLESS STEEL #15

## (undated) DEVICE — DRESSING XEROFORM 5X9IN

## (undated) DEVICE — GOWN POLY REINF X-LONG 2XL

## (undated) DEVICE — CLOSURE SKIN STERI STRIP 1/2X4

## (undated) DEVICE — GLOVE SENSICARE PI GRN 8

## (undated) DEVICE — IMPLANTABLE DEVICE
Type: IMPLANTABLE DEVICE | Site: LEG | Status: NON-FUNCTIONAL
Removed: 2024-01-24